# Patient Record
Sex: MALE | Race: WHITE | NOT HISPANIC OR LATINO | URBAN - METROPOLITAN AREA
[De-identification: names, ages, dates, MRNs, and addresses within clinical notes are randomized per-mention and may not be internally consistent; named-entity substitution may affect disease eponyms.]

---

## 2017-06-20 ENCOUNTER — FOLLOW UP (OUTPATIENT)
Dept: URBAN - METROPOLITAN AREA CLINIC 52 | Facility: CLINIC | Age: 57
End: 2017-06-20

## 2017-06-20 DIAGNOSIS — H35.351: ICD-10-CM

## 2017-06-20 DIAGNOSIS — Z98.890: ICD-10-CM

## 2017-06-20 DIAGNOSIS — H33.001: ICD-10-CM

## 2017-06-20 DIAGNOSIS — H43.813: ICD-10-CM

## 2017-06-20 PROCEDURE — 92226 OPHTHALMOSCOPY (SUB): CPT

## 2017-06-20 PROCEDURE — G8427 DOCREV CUR MEDS BY ELIG CLIN: HCPCS

## 2017-06-20 PROCEDURE — 92014 COMPRE OPH EXAM EST PT 1/>: CPT

## 2017-06-20 PROCEDURE — 92134 CPTRZ OPH DX IMG PST SGM RTA: CPT

## 2017-06-20 PROCEDURE — 4040F PNEUMOC VAC/ADMIN/RCVD: CPT

## 2017-06-20 PROCEDURE — 1036F TOBACCO NON-USER: CPT

## 2017-06-20 ASSESSMENT — VISUAL ACUITY
OD_CC: 20/40
OS_CC: 20/30-1
OD_CC: 20/60-1
OD_PH: 20/50-1
OS_CC: 20/30-1

## 2017-06-20 ASSESSMENT — TONOMETRY
OD_IOP_MMHG: 14
OS_IOP_MMHG: 16

## 2018-06-15 ENCOUNTER — FOLLOW UP (OUTPATIENT)
Dept: URBAN - METROPOLITAN AREA CLINIC 52 | Facility: CLINIC | Age: 58
End: 2018-06-15

## 2018-06-15 DIAGNOSIS — H35.351: ICD-10-CM

## 2018-06-15 DIAGNOSIS — H33.001: ICD-10-CM

## 2018-06-15 DIAGNOSIS — H43.813: ICD-10-CM

## 2018-06-15 DIAGNOSIS — Z98.890: ICD-10-CM

## 2018-06-15 PROCEDURE — 92014 COMPRE OPH EXAM EST PT 1/>: CPT

## 2018-06-15 PROCEDURE — 92226 OPHTHALMOSCOPY (SUB): CPT

## 2018-06-15 PROCEDURE — 92134 CPTRZ OPH DX IMG PST SGM RTA: CPT

## 2018-06-15 PROCEDURE — G8427 DOCREV CUR MEDS BY ELIG CLIN: HCPCS

## 2018-06-15 PROCEDURE — 4040F PNEUMOC VAC/ADMIN/RCVD: CPT

## 2018-06-15 PROCEDURE — 1036F TOBACCO NON-USER: CPT

## 2018-06-15 ASSESSMENT — VISUAL ACUITY
OS_CC: 20/30-2
OD_CC: 20/30
OD_CC: 20/40
OS_CC: 20/40

## 2018-06-15 ASSESSMENT — TONOMETRY
OS_IOP_MMHG: 15
OD_IOP_MMHG: 15

## 2019-07-18 ENCOUNTER — FOLLOW UP (OUTPATIENT)
Dept: URBAN - METROPOLITAN AREA CLINIC 52 | Facility: CLINIC | Age: 59
End: 2019-07-18

## 2019-07-18 DIAGNOSIS — H35.351: ICD-10-CM

## 2019-07-18 DIAGNOSIS — H43.813: ICD-10-CM

## 2019-07-18 DIAGNOSIS — E11.9: ICD-10-CM

## 2019-07-18 DIAGNOSIS — H33.001: ICD-10-CM

## 2019-07-18 DIAGNOSIS — H35.371: ICD-10-CM

## 2019-07-18 DIAGNOSIS — H25.9: ICD-10-CM

## 2019-07-18 DIAGNOSIS — Z79.4: ICD-10-CM

## 2019-07-18 PROCEDURE — 92226 OPHTHALMOSCOPY (SUB): CPT

## 2019-07-18 PROCEDURE — 92014 COMPRE OPH EXAM EST PT 1/>: CPT

## 2019-07-18 PROCEDURE — 92134 CPTRZ OPH DX IMG PST SGM RTA: CPT

## 2019-07-18 ASSESSMENT — VISUAL ACUITY
OD_CC: 20/30
OD_CC: 20/30
OS_CC: 20/40
OS_CC: 20/30

## 2019-07-18 ASSESSMENT — TONOMETRY
OD_IOP_MMHG: 9
OS_IOP_MMHG: 10

## 2020-09-14 ENCOUNTER — FOLLOW UP (OUTPATIENT)
Dept: URBAN - METROPOLITAN AREA CLINIC 52 | Facility: CLINIC | Age: 60
End: 2020-09-14

## 2020-09-14 VITALS — HEIGHT: 60 IN

## 2020-09-14 DIAGNOSIS — H25.9: ICD-10-CM

## 2020-09-14 DIAGNOSIS — H35.371: ICD-10-CM

## 2020-09-14 DIAGNOSIS — H43.813: ICD-10-CM

## 2020-09-14 DIAGNOSIS — E11.9: ICD-10-CM

## 2020-09-14 DIAGNOSIS — H33.001: ICD-10-CM

## 2020-09-14 DIAGNOSIS — Z79.4: ICD-10-CM

## 2020-09-14 DIAGNOSIS — H35.351: ICD-10-CM

## 2020-09-14 PROCEDURE — 92014 COMPRE OPH EXAM EST PT 1/>: CPT

## 2020-09-14 PROCEDURE — 92201 OPSCPY EXTND RTA DRAW UNI/BI: CPT

## 2020-09-14 PROCEDURE — 92134 CPTRZ OPH DX IMG PST SGM RTA: CPT

## 2020-09-14 ASSESSMENT — TONOMETRY
OS_IOP_MMHG: 16
OD_IOP_MMHG: 17

## 2020-09-14 ASSESSMENT — VISUAL ACUITY
OS_CC: 20/30
OD_CC: 20/40-1

## 2021-09-21 ENCOUNTER — FOLLOW UP (OUTPATIENT)
Dept: URBAN - METROPOLITAN AREA CLINIC 52 | Facility: CLINIC | Age: 61
End: 2021-09-21

## 2021-09-21 DIAGNOSIS — H35.351: ICD-10-CM

## 2021-09-21 DIAGNOSIS — H25.9: ICD-10-CM

## 2021-09-21 DIAGNOSIS — H33.001: ICD-10-CM

## 2021-09-21 DIAGNOSIS — H35.371: ICD-10-CM

## 2021-09-21 DIAGNOSIS — Z79.4: ICD-10-CM

## 2021-09-21 DIAGNOSIS — H43.813: ICD-10-CM

## 2021-09-21 DIAGNOSIS — E11.9: ICD-10-CM

## 2021-09-21 PROCEDURE — 92014 COMPRE OPH EXAM EST PT 1/>: CPT

## 2021-09-21 PROCEDURE — 92134 CPTRZ OPH DX IMG PST SGM RTA: CPT

## 2021-09-21 PROCEDURE — 92201 OPSCPY EXTND RTA DRAW UNI/BI: CPT

## 2021-09-21 ASSESSMENT — VISUAL ACUITY
OD_CC: 20/40-2
OS_CC: 20/30-2

## 2021-09-21 ASSESSMENT — TONOMETRY
OD_IOP_MMHG: 14
OS_IOP_MMHG: 15

## 2024-09-12 ENCOUNTER — ESTABLISHED COMPREHENSIVE EXAM (OUTPATIENT)
Dept: URBAN - METROPOLITAN AREA CLINIC 6 | Facility: CLINIC | Age: 64
End: 2024-09-12

## 2024-09-12 DIAGNOSIS — H43.813: ICD-10-CM

## 2024-09-12 DIAGNOSIS — H04.123: ICD-10-CM

## 2024-09-12 DIAGNOSIS — H02.831: ICD-10-CM

## 2024-09-12 DIAGNOSIS — H01.024: ICD-10-CM

## 2024-09-12 DIAGNOSIS — H02.834: ICD-10-CM

## 2024-09-12 DIAGNOSIS — E11.9: ICD-10-CM

## 2024-09-12 DIAGNOSIS — H25.813: ICD-10-CM

## 2024-09-12 DIAGNOSIS — H01.021: ICD-10-CM

## 2024-09-12 DIAGNOSIS — B88.0: ICD-10-CM

## 2024-09-12 DIAGNOSIS — H35.371: ICD-10-CM

## 2024-09-12 DIAGNOSIS — Z79.4: ICD-10-CM

## 2024-09-12 PROCEDURE — 92014 COMPRE OPH EXAM EST PT 1/>: CPT

## 2024-09-12 ASSESSMENT — TONOMETRY
OD_IOP_MMHG: 16
OS_IOP_MMHG: 17

## 2024-09-12 ASSESSMENT — VISUAL ACUITY
OD_GLARE: 20/50
OD_CC: 20/30
OS_CC: 20/25-2

## 2024-10-30 ENCOUNTER — PRE-OP CATARACT MEASUREMENTS (OUTPATIENT)
Dept: URBAN - METROPOLITAN AREA CLINIC 6 | Facility: CLINIC | Age: 64
End: 2024-10-30

## 2024-10-30 DIAGNOSIS — B88.0: ICD-10-CM

## 2024-10-30 DIAGNOSIS — H25.813: ICD-10-CM

## 2024-10-30 PROCEDURE — 92012 INTRM OPH EXAM EST PATIENT: CPT

## 2024-10-30 PROCEDURE — 92136 OPHTHALMIC BIOMETRY: CPT

## 2024-10-30 ASSESSMENT — VISUAL ACUITY
OU_CC: J2
OS_GLARE: 20/200
OD_GLARE: 20/200
OS_CC: 20/60+2
OD_PH: 20/50
OD_CC: 20/60

## 2024-10-30 ASSESSMENT — TONOMETRY
OD_IOP_MMHG: 11
OS_IOP_MMHG: 16

## 2024-11-20 ENCOUNTER — 1 DAY POST-OP (OUTPATIENT)
Dept: URBAN - METROPOLITAN AREA CLINIC 6 | Facility: CLINIC | Age: 64
End: 2024-11-20

## 2024-11-20 DIAGNOSIS — Z96.1: ICD-10-CM

## 2024-11-20 DIAGNOSIS — H25.811: ICD-10-CM

## 2024-11-20 PROCEDURE — 92136 OPHTHALMIC BIOMETRY: CPT

## 2024-11-20 PROCEDURE — 99024 POSTOP FOLLOW-UP VISIT: CPT

## 2024-11-20 ASSESSMENT — VISUAL ACUITY
OS_SC: 20/30-1
OD_SC: J2
OS_SC: J3
OD_SC: 20/100-1

## 2024-11-20 ASSESSMENT — TONOMETRY
OS_IOP_MMHG: 21
OD_IOP_MMHG: 18

## 2024-11-27 ENCOUNTER — 1 WEEK POST-OP (OUTPATIENT)
Dept: URBAN - METROPOLITAN AREA CLINIC 6 | Facility: CLINIC | Age: 64
End: 2024-11-27

## 2024-11-27 DIAGNOSIS — H25.811: ICD-10-CM

## 2024-11-27 DIAGNOSIS — Z96.1: ICD-10-CM

## 2024-11-27 PROCEDURE — 99024 POSTOP FOLLOW-UP VISIT: CPT

## 2024-11-27 ASSESSMENT — VISUAL ACUITY
OD_SC: 20/50
OU_CC: J16
OS_SC: 20/40
OU_SC: J3

## 2024-11-27 ASSESSMENT — TONOMETRY
OS_IOP_MMHG: 16
OD_IOP_MMHG: 13

## 2024-12-04 ENCOUNTER — 1 DAY POST-OP (OUTPATIENT)
Dept: URBAN - METROPOLITAN AREA CLINIC 6 | Facility: CLINIC | Age: 64
End: 2024-12-04

## 2024-12-04 DIAGNOSIS — Z96.1: ICD-10-CM

## 2024-12-04 PROCEDURE — 99024 POSTOP FOLLOW-UP VISIT: CPT

## 2024-12-04 ASSESSMENT — VISUAL ACUITY
OS_SC: 20/25+2
OD_SC: 20/30

## 2024-12-04 ASSESSMENT — TONOMETRY
OS_IOP_MMHG: 17
OD_IOP_MMHG: 14

## 2024-12-11 ENCOUNTER — 1 WEEK POST-OP (OUTPATIENT)
Dept: URBAN - METROPOLITAN AREA CLINIC 6 | Facility: CLINIC | Age: 64
End: 2024-12-11

## 2024-12-11 DIAGNOSIS — Z96.1: ICD-10-CM

## 2024-12-11 PROCEDURE — 99024 POSTOP FOLLOW-UP VISIT: CPT

## 2024-12-11 ASSESSMENT — VISUAL ACUITY
OU_SC: 20/30-1
OS_SC: 20/30-2
OD_SC: 20/30

## 2024-12-11 ASSESSMENT — TONOMETRY
OS_IOP_MMHG: 16
OD_IOP_MMHG: 17

## 2024-12-17 LAB
ALBUMIN SERPL BCG-MCNC: 3.7 G/DL (ref 3.5–5)
ALP SERPL-CCNC: 71 U/L (ref 34–104)
ALT SERPL W P-5'-P-CCNC: 17 U/L (ref 7–52)
ANION GAP SERPL CALCULATED.3IONS-SCNC: 9 MMOL/L (ref 4–13)
AST SERPL W P-5'-P-CCNC: 20 U/L (ref 13–39)
BASOPHILS # BLD AUTO: 0.05 THOUSANDS/ΜL (ref 0–0.1)
BASOPHILS NFR BLD AUTO: 0 % (ref 0–1)
BILIRUB SERPL-MCNC: 0.69 MG/DL (ref 0.2–1)
BUN SERPL-MCNC: 60 MG/DL (ref 5–25)
CALCIUM SERPL-MCNC: 7.9 MG/DL (ref 8.4–10.2)
CHLORIDE SERPL-SCNC: 97 MMOL/L (ref 96–108)
CO2 SERPL-SCNC: 21 MMOL/L (ref 21–32)
CREAT SERPL-MCNC: 2.56 MG/DL (ref 0.6–1.3)
EOSINOPHIL # BLD AUTO: 0.02 THOUSAND/ΜL (ref 0–0.61)
EOSINOPHIL NFR BLD AUTO: 0 % (ref 0–6)
ERYTHROCYTE [DISTWIDTH] IN BLOOD BY AUTOMATED COUNT: 15.6 % (ref 11.6–15.1)
FLUAV RNA RESP QL NAA+PROBE: NEGATIVE
FLUBV RNA RESP QL NAA+PROBE: NEGATIVE
GFR SERPL CREATININE-BSD FRML MDRD: 25 ML/MIN/1.73SQ M
GLUCOSE SERPL-MCNC: 285 MG/DL (ref 65–140)
HCT VFR BLD AUTO: 57.6 % (ref 36.5–49.3)
HGB BLD-MCNC: 18 G/DL (ref 12–17)
IMM GRANULOCYTES # BLD AUTO: 0.05 THOUSAND/UL (ref 0–0.2)
IMM GRANULOCYTES NFR BLD AUTO: 0 % (ref 0–2)
LIPASE SERPL-CCNC: 49 U/L (ref 11–82)
LYMPHOCYTES # BLD AUTO: 0.78 THOUSANDS/ΜL (ref 0.6–4.47)
LYMPHOCYTES NFR BLD AUTO: 6 % (ref 14–44)
MCH RBC QN AUTO: 27.2 PG (ref 26.8–34.3)
MCHC RBC AUTO-ENTMCNC: 31.7 G/DL (ref 31.4–37.4)
MCV RBC AUTO: 86 FL (ref 82–98)
MONOCYTES # BLD AUTO: 0.97 THOUSAND/ΜL (ref 0.17–1.22)
MONOCYTES NFR BLD AUTO: 7 % (ref 4–12)
NEUTROPHILS # BLD AUTO: 11.51 THOUSANDS/ΜL (ref 1.85–7.62)
NEUTS SEG NFR BLD AUTO: 87 % (ref 43–75)
NRBC BLD AUTO-RTO: 0 /100 WBCS
PLATELET # BLD AUTO: 203 THOUSANDS/UL (ref 149–390)
PMV BLD AUTO: 10 FL (ref 8.9–12.7)
POTASSIUM SERPL-SCNC: 5 MMOL/L (ref 3.5–5.3)
PROT SERPL-MCNC: 6 G/DL (ref 6.4–8.4)
RBC # BLD AUTO: 6.62 MILLION/UL (ref 3.88–5.62)
RSV RNA RESP QL NAA+PROBE: NEGATIVE
SARS-COV-2 RNA RESP QL NAA+PROBE: NEGATIVE
SODIUM SERPL-SCNC: 127 MMOL/L (ref 135–147)
WBC # BLD AUTO: 13.38 THOUSAND/UL (ref 4.31–10.16)

## 2024-12-17 PROCEDURE — 99285 EMERGENCY DEPT VISIT HI MDM: CPT

## 2024-12-17 PROCEDURE — 82550 ASSAY OF CK (CPK): CPT

## 2024-12-17 PROCEDURE — 83690 ASSAY OF LIPASE: CPT | Performed by: EMERGENCY MEDICINE

## 2024-12-17 PROCEDURE — 36415 COLL VENOUS BLD VENIPUNCTURE: CPT

## 2024-12-17 PROCEDURE — 85025 COMPLETE CBC W/AUTO DIFF WBC: CPT | Performed by: EMERGENCY MEDICINE

## 2024-12-17 PROCEDURE — 80053 COMPREHEN METABOLIC PANEL: CPT | Performed by: EMERGENCY MEDICINE

## 2024-12-17 PROCEDURE — 83735 ASSAY OF MAGNESIUM: CPT

## 2024-12-17 PROCEDURE — 0241U HB NFCT DS VIR RESP RNA 4 TRGT: CPT | Performed by: EMERGENCY MEDICINE

## 2024-12-18 ENCOUNTER — APPOINTMENT (EMERGENCY)
Dept: CT IMAGING | Facility: HOSPITAL | Age: 64
DRG: 689 | End: 2024-12-18
Payer: MEDICARE

## 2024-12-18 ENCOUNTER — APPOINTMENT (EMERGENCY)
Dept: RADIOLOGY | Facility: HOSPITAL | Age: 64
DRG: 689 | End: 2024-12-18
Payer: MEDICARE

## 2024-12-18 ENCOUNTER — HOSPITAL ENCOUNTER (INPATIENT)
Facility: HOSPITAL | Age: 64
LOS: 2 days | Discharge: HOME | DRG: 689 | End: 2024-12-20
Attending: EMERGENCY MEDICINE | Admitting: HOSPITALIST
Payer: MEDICARE

## 2024-12-18 DIAGNOSIS — N39.0 UTI (URINARY TRACT INFECTION): ICD-10-CM

## 2024-12-18 DIAGNOSIS — E87.1 HYPONATREMIA: ICD-10-CM

## 2024-12-18 DIAGNOSIS — J18.9 PNEUMONIA: ICD-10-CM

## 2024-12-18 DIAGNOSIS — A41.9 SEPSIS (HCC): Primary | ICD-10-CM

## 2024-12-18 DIAGNOSIS — R73.9 HYPERGLYCEMIA: ICD-10-CM

## 2024-12-18 PROBLEM — N18.32 STAGE 3B CHRONIC KIDNEY DISEASE (HCC): Status: ACTIVE | Noted: 2024-12-18

## 2024-12-18 PROBLEM — E78.5 HLD (HYPERLIPIDEMIA): Status: ACTIVE | Noted: 2024-12-18

## 2024-12-18 PROBLEM — D75.1 ERYTHROCYTOSIS: Status: ACTIVE | Noted: 2024-12-18

## 2024-12-18 PROBLEM — E11.9 TYPE 2 DIABETES MELLITUS (HCC): Status: ACTIVE | Noted: 2024-12-18

## 2024-12-18 PROBLEM — R06.02 SHORTNESS OF BREATH: Status: ACTIVE | Noted: 2024-12-18

## 2024-12-18 PROBLEM — R31.9 HEMATURIA: Status: ACTIVE | Noted: 2024-12-18

## 2024-12-18 PROBLEM — I48.91 A-FIB (HCC): Status: ACTIVE | Noted: 2024-12-18

## 2024-12-18 LAB
ANION GAP SERPL CALCULATED.3IONS-SCNC: 8 MMOL/L (ref 4–13)
ANION GAP SERPL CALCULATED.3IONS-SCNC: 9 MMOL/L (ref 4–13)
APTT PPP: 33 SECONDS (ref 23–34)
ATRIAL RATE: 102 BPM
BACTERIA UR QL AUTO: ABNORMAL /HPF
BASOPHILS # BLD AUTO: 0.04 THOUSANDS/ΜL (ref 0–0.1)
BASOPHILS NFR BLD AUTO: 0 % (ref 0–1)
BILIRUB UR QL STRIP: NEGATIVE
BUN SERPL-MCNC: 61 MG/DL (ref 5–25)
BUN SERPL-MCNC: 63 MG/DL (ref 5–25)
C3 SERPL-MCNC: 110 MG/DL (ref 87–200)
CALCIUM SERPL-MCNC: 7.7 MG/DL (ref 8.4–10.2)
CALCIUM SERPL-MCNC: 7.8 MG/DL (ref 8.4–10.2)
CHLORIDE SERPL-SCNC: 98 MMOL/L (ref 96–108)
CHLORIDE SERPL-SCNC: 98 MMOL/L (ref 96–108)
CK SERPL-CCNC: 72 U/L (ref 39–308)
CLARITY UR: CLEAR
CO2 SERPL-SCNC: 20 MMOL/L (ref 21–32)
CO2 SERPL-SCNC: 21 MMOL/L (ref 21–32)
COLOR UR: ABNORMAL
CREAT SERPL-MCNC: 2.75 MG/DL (ref 0.6–1.3)
CREAT SERPL-MCNC: 2.87 MG/DL (ref 0.6–1.3)
EOSINOPHIL # BLD AUTO: 0.02 THOUSAND/ΜL (ref 0–0.61)
EOSINOPHIL NFR BLD AUTO: 0 % (ref 0–6)
ERYTHROCYTE [DISTWIDTH] IN BLOOD BY AUTOMATED COUNT: 15.5 % (ref 11.6–15.1)
EST. AVERAGE GLUCOSE BLD GHB EST-MCNC: 163 MG/DL
GFR SERPL CREATININE-BSD FRML MDRD: 21 ML/MIN/1.73SQ M
GFR SERPL CREATININE-BSD FRML MDRD: 22 ML/MIN/1.73SQ M
GLUCOSE SERPL-MCNC: 155 MG/DL (ref 65–140)
GLUCOSE SERPL-MCNC: 172 MG/DL (ref 65–140)
GLUCOSE SERPL-MCNC: 186 MG/DL (ref 65–140)
GLUCOSE SERPL-MCNC: 193 MG/DL (ref 65–140)
GLUCOSE SERPL-MCNC: 193 MG/DL (ref 65–140)
GLUCOSE SERPL-MCNC: 227 MG/DL (ref 65–140)
GLUCOSE UR STRIP-MCNC: ABNORMAL MG/DL
HBA1C MFR BLD: 7.3 %
HCT VFR BLD AUTO: 53.8 % (ref 36.5–49.3)
HGB BLD-MCNC: 17.5 G/DL (ref 12–17)
HGB UR QL STRIP.AUTO: ABNORMAL
IMM GRANULOCYTES # BLD AUTO: 0.04 THOUSAND/UL (ref 0–0.2)
IMM GRANULOCYTES NFR BLD AUTO: 0 % (ref 0–2)
INR PPP: 1.82 (ref 0.85–1.19)
KETONES UR STRIP-MCNC: NEGATIVE MG/DL
L PNEUMO1 AG UR QL IA.RAPID: NEGATIVE
LACTATE SERPL-SCNC: 1.2 MMOL/L (ref 0.5–2)
LEUKOCYTE ESTERASE UR QL STRIP: ABNORMAL
LYMPHOCYTES # BLD AUTO: 0.84 THOUSANDS/ΜL (ref 0.6–4.47)
LYMPHOCYTES NFR BLD AUTO: 7 % (ref 14–44)
MAGNESIUM SERPL-MCNC: 2.4 MG/DL (ref 1.9–2.7)
MCH RBC QN AUTO: 27.3 PG (ref 26.8–34.3)
MCHC RBC AUTO-ENTMCNC: 32.5 G/DL (ref 31.4–37.4)
MCV RBC AUTO: 84 FL (ref 82–98)
MONOCYTES # BLD AUTO: 1.12 THOUSAND/ΜL (ref 0.17–1.22)
MONOCYTES NFR BLD AUTO: 9 % (ref 4–12)
NEUTROPHILS # BLD AUTO: 10.6 THOUSANDS/ΜL (ref 1.85–7.62)
NEUTS SEG NFR BLD AUTO: 84 % (ref 43–75)
NITRITE UR QL STRIP: NEGATIVE
NON-SQ EPI CELLS URNS QL MICRO: ABNORMAL /HPF
NRBC BLD AUTO-RTO: 0 /100 WBCS
OSMOLALITY UR/SERPL-RTO: 299 MMOL/KG (ref 282–298)
OSMOLALITY UR: 580 MMOL/KG (ref 250–900)
PH UR STRIP.AUTO: 5 [PH]
PLATELET # BLD AUTO: 229 THOUSANDS/UL (ref 149–390)
PMV BLD AUTO: 10.6 FL (ref 8.9–12.7)
POTASSIUM SERPL-SCNC: 4.7 MMOL/L (ref 3.5–5.3)
POTASSIUM SERPL-SCNC: 5.9 MMOL/L (ref 3.5–5.3)
PROCALCITONIN SERPL-MCNC: 0.23 NG/ML
PROT UR STRIP-MCNC: NEGATIVE MG/DL
PROTHROMBIN TIME: 21.8 SECONDS (ref 12.3–15)
QRS AXIS: 255 DEGREES
QRSD INTERVAL: 142 MS
QT INTERVAL: 388 MS
QTC INTERVAL: 508 MS
RBC # BLD AUTO: 6.42 MILLION/UL (ref 3.88–5.62)
RBC #/AREA URNS AUTO: ABNORMAL /HPF
S PNEUM AG UR QL: NEGATIVE
SODIUM 24H UR-SCNC: 33 MOL/L
SODIUM SERPL-SCNC: 126 MMOL/L (ref 135–147)
SODIUM SERPL-SCNC: 128 MMOL/L (ref 135–147)
SP GR UR STRIP.AUTO: 1.02 (ref 1–1.03)
T WAVE AXIS: 27 DEGREES
UROBILINOGEN UR STRIP-ACNC: <2 MG/DL
VENTRICULAR RATE: 103 BPM
WBC # BLD AUTO: 12.66 THOUSAND/UL (ref 4.31–10.16)
WBC #/AREA URNS AUTO: ABNORMAL /HPF

## 2024-12-18 PROCEDURE — 81001 URINALYSIS AUTO W/SCOPE: CPT | Performed by: EMERGENCY MEDICINE

## 2024-12-18 PROCEDURE — 94760 N-INVAS EAR/PLS OXIMETRY 1: CPT

## 2024-12-18 PROCEDURE — 83935 ASSAY OF URINE OSMOLALITY: CPT

## 2024-12-18 PROCEDURE — 94640 AIRWAY INHALATION TREATMENT: CPT

## 2024-12-18 PROCEDURE — 96375 TX/PRO/DX INJ NEW DRUG ADDON: CPT

## 2024-12-18 PROCEDURE — 87086 URINE CULTURE/COLONY COUNT: CPT

## 2024-12-18 PROCEDURE — 99223 1ST HOSP IP/OBS HIGH 75: CPT | Performed by: HOSPITALIST

## 2024-12-18 PROCEDURE — 84145 PROCALCITONIN (PCT): CPT

## 2024-12-18 PROCEDURE — 36415 COLL VENOUS BLD VENIPUNCTURE: CPT

## 2024-12-18 PROCEDURE — 85730 THROMBOPLASTIN TIME PARTIAL: CPT

## 2024-12-18 PROCEDURE — 96361 HYDRATE IV INFUSION ADD-ON: CPT

## 2024-12-18 PROCEDURE — 74176 CT ABD & PELVIS W/O CONTRAST: CPT

## 2024-12-18 PROCEDURE — 85610 PROTHROMBIN TIME: CPT

## 2024-12-18 PROCEDURE — 87449 NOS EACH ORGANISM AG IA: CPT

## 2024-12-18 PROCEDURE — 71250 CT THORAX DX C-: CPT

## 2024-12-18 PROCEDURE — 86160 COMPLEMENT ANTIGEN: CPT

## 2024-12-18 PROCEDURE — 80048 BASIC METABOLIC PNL TOTAL CA: CPT

## 2024-12-18 PROCEDURE — 83605 ASSAY OF LACTIC ACID: CPT

## 2024-12-18 PROCEDURE — 94668 MNPJ CHEST WALL SBSQ: CPT

## 2024-12-18 PROCEDURE — 82948 REAGENT STRIP/BLOOD GLUCOSE: CPT

## 2024-12-18 PROCEDURE — 71045 X-RAY EXAM CHEST 1 VIEW: CPT

## 2024-12-18 PROCEDURE — 93010 ELECTROCARDIOGRAM REPORT: CPT | Performed by: INTERNAL MEDICINE

## 2024-12-18 PROCEDURE — 99285 EMERGENCY DEPT VISIT HI MDM: CPT | Performed by: EMERGENCY MEDICINE

## 2024-12-18 PROCEDURE — 85025 COMPLETE CBC W/AUTO DIFF WBC: CPT

## 2024-12-18 PROCEDURE — 83930 ASSAY OF BLOOD OSMOLALITY: CPT

## 2024-12-18 PROCEDURE — 84300 ASSAY OF URINE SODIUM: CPT

## 2024-12-18 PROCEDURE — 83036 HEMOGLOBIN GLYCOSYLATED A1C: CPT

## 2024-12-18 PROCEDURE — 93005 ELECTROCARDIOGRAM TRACING: CPT

## 2024-12-18 PROCEDURE — 96374 THER/PROPH/DIAG INJ IV PUSH: CPT

## 2024-12-18 RX ORDER — LOSARTAN POTASSIUM 25 MG/1
25 TABLET ORAL
Status: DISCONTINUED | OUTPATIENT
Start: 2024-12-18 | End: 2024-12-20 | Stop reason: HOSPADM

## 2024-12-18 RX ORDER — INSULIN LISPRO 100 [IU]/ML
2-12 INJECTION, SOLUTION INTRAVENOUS; SUBCUTANEOUS
Status: DISCONTINUED | OUTPATIENT
Start: 2024-12-18 | End: 2024-12-20 | Stop reason: HOSPADM

## 2024-12-18 RX ORDER — LEVALBUTEROL INHALATION SOLUTION 0.63 MG/3ML
0.63 SOLUTION RESPIRATORY (INHALATION) 2 TIMES DAILY
Status: DISCONTINUED | OUTPATIENT
Start: 2024-12-18 | End: 2024-12-18

## 2024-12-18 RX ORDER — GUAIFENESIN 600 MG/1
600 TABLET, EXTENDED RELEASE ORAL EVERY 12 HOURS SCHEDULED
Status: DISCONTINUED | OUTPATIENT
Start: 2024-12-18 | End: 2024-12-20 | Stop reason: HOSPADM

## 2024-12-18 RX ORDER — SODIUM CHLORIDE 9 MG/ML
75 INJECTION, SOLUTION INTRAVENOUS CONTINUOUS
Status: DISPENSED | OUTPATIENT
Start: 2024-12-18 | End: 2024-12-18

## 2024-12-18 RX ORDER — ACETAMINOPHEN 325 MG/1
650 TABLET ORAL EVERY 6 HOURS PRN
Status: DISCONTINUED | OUTPATIENT
Start: 2024-12-18 | End: 2024-12-18

## 2024-12-18 RX ORDER — PRAVASTATIN SODIUM 40 MG
40 TABLET ORAL
Status: DISCONTINUED | OUTPATIENT
Start: 2024-12-18 | End: 2024-12-20 | Stop reason: HOSPADM

## 2024-12-18 RX ORDER — ONDANSETRON 2 MG/ML
4 INJECTION INTRAMUSCULAR; INTRAVENOUS EVERY 8 HOURS PRN
Status: DISCONTINUED | OUTPATIENT
Start: 2024-12-18 | End: 2024-12-18

## 2024-12-18 RX ORDER — ONDANSETRON 2 MG/ML
4 INJECTION INTRAMUSCULAR; INTRAVENOUS ONCE
Status: COMPLETED | OUTPATIENT
Start: 2024-12-18 | End: 2024-12-18

## 2024-12-18 RX ORDER — METOPROLOL TARTRATE 50 MG
50 TABLET ORAL EVERY 12 HOURS SCHEDULED
Status: DISCONTINUED | OUTPATIENT
Start: 2024-12-18 | End: 2024-12-20 | Stop reason: HOSPADM

## 2024-12-18 RX ORDER — INSULIN GLARGINE 100 [IU]/ML
40 INJECTION, SOLUTION SUBCUTANEOUS EVERY 12 HOURS SCHEDULED
Status: DISCONTINUED | OUTPATIENT
Start: 2024-12-18 | End: 2024-12-20 | Stop reason: HOSPADM

## 2024-12-18 RX ORDER — ACETAMINOPHEN 325 MG/1
975 TABLET ORAL EVERY 6 HOURS PRN
Status: DISCONTINUED | OUTPATIENT
Start: 2024-12-18 | End: 2024-12-20 | Stop reason: HOSPADM

## 2024-12-18 RX ORDER — AZITHROMYCIN 250 MG/1
500 TABLET, FILM COATED ORAL EVERY 24 HOURS
Status: DISCONTINUED | OUTPATIENT
Start: 2024-12-18 | End: 2024-12-19

## 2024-12-18 RX ORDER — HYDROMORPHONE HCL/PF 1 MG/ML
0.5 SYRINGE (ML) INJECTION ONCE
Refills: 0 | Status: COMPLETED | OUTPATIENT
Start: 2024-12-18 | End: 2024-12-18

## 2024-12-18 RX ORDER — LEVALBUTEROL INHALATION SOLUTION 1.25 MG/3ML
1.25 SOLUTION RESPIRATORY (INHALATION)
Status: DISCONTINUED | OUTPATIENT
Start: 2024-12-18 | End: 2024-12-20 | Stop reason: HOSPADM

## 2024-12-18 RX ADMIN — ONDANSETRON 4 MG: 2 INJECTION, SOLUTION INTRAMUSCULAR; INTRAVENOUS at 02:43

## 2024-12-18 RX ADMIN — INSULIN LISPRO 2 UNITS: 100 INJECTION, SOLUTION INTRAVENOUS; SUBCUTANEOUS at 21:06

## 2024-12-18 RX ADMIN — INSULIN LISPRO 2 UNITS: 100 INJECTION, SOLUTION INTRAVENOUS; SUBCUTANEOUS at 13:20

## 2024-12-18 RX ADMIN — METOPROLOL TARTRATE 50 MG: 50 TABLET, FILM COATED ORAL at 09:22

## 2024-12-18 RX ADMIN — METOPROLOL TARTRATE 50 MG: 50 TABLET, FILM COATED ORAL at 21:05

## 2024-12-18 RX ADMIN — INSULIN LISPRO 2 UNITS: 100 INJECTION, SOLUTION INTRAVENOUS; SUBCUTANEOUS at 18:29

## 2024-12-18 RX ADMIN — AZITHROMYCIN DIHYDRATE 500 MG: 250 TABLET ORAL at 05:59

## 2024-12-18 RX ADMIN — SODIUM CHLORIDE 500 ML: 0.9 INJECTION, SOLUTION INTRAVENOUS at 02:46

## 2024-12-18 RX ADMIN — HYDROMORPHONE HYDROCHLORIDE 0.5 MG: 1 INJECTION, SOLUTION INTRAMUSCULAR; INTRAVENOUS; SUBCUTANEOUS at 02:46

## 2024-12-18 RX ADMIN — INSULIN GLARGINE 40 UNITS: 100 INJECTION, SOLUTION SUBCUTANEOUS at 21:05

## 2024-12-18 RX ADMIN — DEXTROSE 1000 MG: 50 INJECTION, SOLUTION INTRAVENOUS at 04:58

## 2024-12-18 RX ADMIN — GUAIFENESIN 600 MG: 600 TABLET ORAL at 21:05

## 2024-12-18 RX ADMIN — PRAVASTATIN SODIUM 40 MG: 40 TABLET ORAL at 16:19

## 2024-12-18 RX ADMIN — INSULIN LISPRO 2 UNITS: 100 INJECTION, SOLUTION INTRAVENOUS; SUBCUTANEOUS at 09:19

## 2024-12-18 RX ADMIN — LEVALBUTEROL HYDROCHLORIDE 1.25 MG: 1.25 SOLUTION RESPIRATORY (INHALATION) at 19:34

## 2024-12-18 RX ADMIN — INSULIN GLARGINE 40 UNITS: 100 INJECTION, SOLUTION SUBCUTANEOUS at 09:21

## 2024-12-18 RX ADMIN — SODIUM CHLORIDE 75 ML/HR: 0.9 INJECTION, SOLUTION INTRAVENOUS at 06:03

## 2024-12-18 RX ADMIN — GUAIFENESIN 600 MG: 600 TABLET ORAL at 09:22

## 2024-12-18 RX ADMIN — LEVALBUTEROL HYDROCHLORIDE 0.63 MG: 0.63 SOLUTION RESPIRATORY (INHALATION) at 06:06

## 2024-12-18 RX ADMIN — TRIMETHOBENZAMIDE HYDROCHLORIDE 200 MG: 100 INJECTION INTRAMUSCULAR at 10:45

## 2024-12-18 RX ADMIN — CEFTRIAXONE SODIUM 1000 MG: 10 INJECTION, POWDER, FOR SOLUTION INTRAVENOUS at 23:04

## 2024-12-18 RX ADMIN — ACETAMINOPHEN 650 MG: 325 TABLET, FILM COATED ORAL at 13:24

## 2024-12-18 NOTE — ASSESSMENT & PLAN NOTE
Patient's sodium at 127  Can be secondary to decreased oral intake versus pneumonia    PLAN:  Follow-up with urine osmole, urine osmole, urine sodium  Follow-up with Legionella urine  Normal saline 75 mL/h for 12 hours

## 2024-12-18 NOTE — ASSESSMENT & PLAN NOTE
Last A1c September, 2024 was 7.0    Blood Sugar Average: Last 72 hrs:    Patient takes detemir 40 units twice daily, Farxiga    PLAN;  Start Lantus 40 units twice daily  Sliding scale

## 2024-12-18 NOTE — SEPSIS NOTE
"  Sepsis Note   Leandro Blanchard 64 y.o. male MRN: [id number]  Unit/Bed#: ED-33 Encounter: [id number]       Initial Sepsis Screening       Row Name 12/18/24 0425 12/18/24 0424             Is the patient's history suggestive of a new or worsening infection? Yes (Proceed)  -MR Yes (Proceed)  -MR       Suspected source of infection urinary tract infection;pneumonia  -MR --       Indicate SIRS criteria Tachycardia > 90 bpm;Leukocytosis (WBC > 73156 IJL) OR Leukopenia (WBC <4000 IJL) OR Bandemia (WBC >10% bands)  -MR --       Are two or more of the above signs & symptoms of infection both present and new to the patient? Yes (Proceed)  -MR --       Assess for evidence of organ dysfunction: Are any of the below criteria present within 6 hours of suspected infection and SIRS criteria that are NOT considered to be chronic conditions? --  -MR --       Date of presentation of severe sepsis --  -MR --       Time of presentation of severe sepsis --  -MR --       Sepsis Note: Click \"NEXT\" below (NOT \"close\") to generate sepsis note based on above information. -- --                 User Key  (r) = Recorded By, (t) = Taken By, (c) = Cosigned By      Initials Name Provider Type    MR Thanh Lemon MD Resident                        Body mass index is 38.32 kg/m².  Wt Readings from Last 1 Encounters:   12/17/24 118 kg (259 lb 7.7 oz)     IBW (Ideal Body Weight): 70.7 kg    Ideal body weight: 70.7 kg (155 lb 13.8 oz)  Adjusted ideal body weight: 89.5 kg (197 lb 5 oz)    "

## 2024-12-18 NOTE — H&P
H&P - Hospitalist   Name: Leandro Blanchard 64 y.o. male I MRN: [id number]  Unit/Bed#: ED-33 I Date of Admission: 12/18/2024   Date of Service: 12/18/2024 I Hospital Day: 0     Assessment & Plan  Shortness of breath  Presented to the ED with shortness of breath, URI symptoms including congestion cough since first week of December  Patient initially seen by urgent care and was started on doxycycline, no improvement was seen again on December 17 and was started on Augmentin, took 1 dose of  Patient denies any fevers or chills  WBC 13.38, lactic acid normal, flu RSV COVID-negative  CT chest:  Mild tree-in-bud nodularity in the right lower lobe suggestive of an infectious or inflammatory process. Bilateral lower lobe peribronchial thickening.  Patient given 1 dose of ceftriaxone in the ED    PLAN;  Continue ceftriaxone  Start azithromycin  Start Mucinex  Start Xopenex nebs plus albuterol inhaler  Follow-up on Legionella, strep pneumo  Follow-up on procalcitonin  Hematuria  Patient complains of bloody urine for the past few days  Patient also complained of left-sided flank pain  Patient is not a smoker  Urinalysis shows moderate occult blood, urine microscopic shows 10-20 RBCs  CT abdomen: Fullness of the left renal collecting system with perinephric stranding, concerns for acute pyelonephritis  PSA December 2023 within normal limits (1.0)  Patient also has history of kidney stones, no kidney stone seen on the CT scan    PLAN:  Continue ceftriaxone  Hold Xarelto in the setting of active hematuria  Stage 3b chronic kidney disease (HCC)  Lab Results   Component Value Date    EGFR 25 12/17/2024    CREATININE 2.56 (H) 12/17/2024     Elevated creatinine, baseline 2.1-2.3  Patient takes losartan, farxiga, and Kerendia   Patient endorses decrease in appetite for the past 2 days    PLAN:  Hold losartan in the setting of elevated creatinine  Normal saline 75 mL/h for 12 hours  Continue to monitor BMP  Avoid nephrotoxic drugs  Type 2  diabetes mellitus (HCC)  Last A1c September, 2024 was 7.0    Blood Sugar Average: Last 72 hrs:    Patient takes detemir 40 units twice daily, Farxiga    PLAN;  Start Lantus 40 units twice daily  Sliding scale  A-fib (HCC)  Continue metoprolol 50 Mg twice daily  Hold Xarelto in the setting of hematuria  HLD (hyperlipidemia)  Patient takes simvastatin 40 Mg daily at home    PLAN;  Start pravastatin 40 Mg daily while in the hospital  Hyponatremia  Patient's sodium at 127  Can be secondary to decreased oral intake versus pneumonia    PLAN:  Follow-up with urine osmole, urine osmole, urine sodium  Follow-up with Legionella urine  Normal saline 75 mL/h for 12 hours  Erythrocytosis  Lab Results   Component Value Date    HCT 57.6 (H) 12/17/2024    HGB 18.0 (H) 12/17/2024      Patient takes testosterone supplements at home  Erythrocytosis can be secondary to testosterone supplements versus obstructive sleep apnea      VTE Pharmacologic Prophylaxis: VTE Score: 3 Moderate Risk (Score 3-4) - Pharmacological DVT Prophylaxis Contraindicated. Sequential Compression Devices Ordered.  Code Status: Level 1 - Full Code   Discussion with family: Updated  (wife) at bedside.    Anticipated Length of Stay: Patient will be admitted on an observation basis with an anticipated length of stay of less than 2 midnights secondary to management of PNA and hematuria.    History of Present Illness   Chief Complaint: SOB and Hematuria     Leandro Blanchard is a 64 y.o. male with a PMH of CKD, diabetes, A-fib  who presents with left flank pain, bloody urine and shortness of breath.  Patient started having URI symptoms initially on the first week of December for which she went to the urgent care and was started on supportive treatment along with doxycycline.  Patient's symptoms continue to worsen and was seen again in urgent care on December 17, 2024 and was started on Augmentin.  Patient only took 1 dose of Augmentin.  Patient also complains  of hematuria along with left flank pain for the past few days.  Patient has history of kidney stones, denies any smoking history.  In the ED, patient does not show any respiratory distress.  Currently on room air.  Vital signs stable.  CT chest abdomen was done which showed concerns for pneumonia and acute pyelonephritis.  Patient received 1 dose of ceftriaxone.  Patient is currently being admitted under [facility] service for management of pneumonia and acute pyelonephritis.    Review of Systems   Constitutional:  Positive for appetite change and fatigue. Negative for chills and fever.   HENT:  Positive for rhinorrhea and sore throat.    Respiratory:  Positive for cough and shortness of breath.    Cardiovascular: Negative.    Gastrointestinal: Negative.    Genitourinary:  Positive for flank pain. Negative for dysuria and urgency.   Neurological: Negative.    Hematological: Negative.    Psychiatric/Behavioral: Negative.         Historical Information   Past Medical History:   Diagnosis Date    A-fib (HCC)     Diabetes mellitus (HCC)     Kidney calculi      Past Surgical History:   Procedure Laterality Date    CATARACT EXTRACTION       Social History     Tobacco Use    Smoking status: Never    Smokeless tobacco: Never   Vaping Use    Vaping status: Never Used   Substance and Sexual Activity    Alcohol use: Never    Drug use: Never    Sexual activity: Not on file     E-Cigarette/Vaping    E-Cigarette Use Never User      E-Cigarette/Vaping Substances     History reviewed. No pertinent family history.  Social History:  Marital Status: /Civil Union   Occupation: N/A  Patient Pre-hospital Living Situation: Home  Patient Pre-hospital Level of Mobility: walks  Patient Pre-hospital Diet Restrictions: N/A    Meds/Allergies   I have reviewed home medications with patient personally.  Prior to Admission medications    Not on File     Allergies   Allergen Reactions    Varibar Honey [Barium Sulfate] Hives    Latex Rash        Objective :  Temp:  [97.7 °F (36.5 °C)] 97.7 °F (36.5 °C)  HR:  [] 93  BP: (109-130)/(66-70) 109/70  Resp:  [12-16] 16  SpO2:  [94 %-96 %] 94 %  O2 Device: None (Room air)    Physical Exam  HENT:      Head: Normocephalic and atraumatic.      Right Ear: External ear normal.      Left Ear: External ear normal.      Mouth/Throat:      Mouth: Mucous membranes are dry.      Pharynx: Oropharynx is clear.   Eyes:      Extraocular Movements: Extraocular movements intact.      Conjunctiva/sclera: Conjunctivae normal.      Pupils: Pupils are equal, round, and reactive to light.   Cardiovascular:      Rate and Rhythm: Normal rate. Rhythm irregular.      Pulses: Normal pulses.      Heart sounds: Normal heart sounds.   Pulmonary:      Effort: Pulmonary effort is normal.      Breath sounds: Wheezing present.   Abdominal:      General: Bowel sounds are normal.      Palpations: Abdomen is soft.   Musculoskeletal:         General: Normal range of motion.      Right lower leg: No edema.      Left lower leg: No edema.   Skin:     General: Skin is warm.      Capillary Refill: Capillary refill takes less than 2 seconds.   Neurological:      General: No focal deficit present.      Mental Status: He is alert and oriented to person, place, and time.          Lines/Drains:            Lab Results: I have reviewed the following results:  Results from last 7 days   Lab Units 12/17/24  2259   WBC Thousand/uL 13.38*   HEMOGLOBIN g/dL 18.0*   HEMATOCRIT % 57.6*   PLATELETS Thousands/uL 203   SEGS PCT % 87*   LYMPHO PCT % 6*   MONO PCT % 7   EOS PCT % 0     Results from last 7 days   Lab Units 12/17/24  2259   SODIUM mmol/L 127*   POTASSIUM mmol/L 5.0   CHLORIDE mmol/L 97   CO2 mmol/L 21   BUN mg/dL 60*   CREATININE mg/dL 2.56*   ANION GAP mmol/L 9   CALCIUM mg/dL 7.9*   ALBUMIN g/dL 3.7   TOTAL BILIRUBIN mg/dL 0.69   ALK PHOS U/L 71   ALT U/L 17   AST U/L 20   GLUCOSE RANDOM mg/dL 285*     Results from last 7 days   Lab Units  "12/18/24 0228   INR  1.82*         No results found for: \"HGBA1C\"  Results from last 7 days   Lab Units 12/18/24 0228   LACTIC ACID mmol/L 1.2       Imaging Results Review: I reviewed radiology reports from this admission including: CT chest and CT abdomen/pelvis.  Other Study Results Review: EKG was reviewed.  EKG was personally reviewed and my interpretation is: Atrial fibrillation. ..    Administrative Statements       ** Please Note: This note has been constructed using a voice recognition system. **    "

## 2024-12-18 NOTE — ASSESSMENT & PLAN NOTE
Presented to the ED with shortness of breath, URI symptoms including congestion cough since first week of December  Patient initially seen by urgent care and was started on doxycycline, no improvement was seen again on December 17 and was started on Augmentin, took 1 dose of  Patient denies any fevers or chills  WBC 13.38, lactic acid normal, flu RSV COVID-negative  CT chest:  Mild tree-in-bud nodularity in the right lower lobe suggestive of an infectious or inflammatory process. Bilateral lower lobe peribronchial thickening.  Patient given 1 dose of ceftriaxone in the ED    PLAN;  Continue ceftriaxone  Start azithromycin  Start Mucinex  Start Xopenex nebs plus albuterol inhaler  Follow-up on Legionella, strep pneumo  Follow-up on procalcitonin

## 2024-12-18 NOTE — ASSESSMENT & PLAN NOTE
Patient takes simvastatin 40 Mg daily at home    PLAN;  Start pravastatin 40 Mg daily while in the hospital

## 2024-12-18 NOTE — ASSESSMENT & PLAN NOTE
Patient complains of bloody urine for the past few days  Patient also complained of left-sided flank pain  Patient is not a smoker  Urinalysis shows moderate occult blood, urine microscopic shows 10-20 RBCs  CT abdomen: Fullness of the left renal collecting system with perinephric stranding, concerns for acute pyelonephritis  PSA December 2023 within normal limits (1.0)  Patient also has history of kidney stones, no kidney stone seen on the CT scan    PLAN:  Continue ceftriaxone  Hold Xarelto in the setting of active hematuria

## 2024-12-18 NOTE — SEPSIS NOTE
"  Sepsis Note   Leandro Blanchard 64 y.o. male MRN: [id number]  Unit/Bed#: ED-33 Encounter: [id number]       Initial Sepsis Screening       Row Name 12/18/24 0425 12/18/24 0424             Is the patient's history suggestive of a new or worsening infection? Yes (Proceed)  -MR Yes (Proceed)  -MR       Suspected source of infection urinary tract infection;pneumonia  -MR --       Indicate SIRS criteria Tachycardia > 90 bpm;Leukocytosis (WBC > 35670 IJL) OR Leukopenia (WBC <4000 IJL) OR Bandemia (WBC >10% bands)  -MR --       Are two or more of the above signs & symptoms of infection both present and new to the patient? Yes (Proceed)  -MR --       Assess for evidence of organ dysfunction: Are any of the below criteria present within 6 hours of suspected infection and SIRS criteria that are NOT considered to be chronic conditions? --  -MR --       Date of presentation of severe sepsis --  -MR --       Time of presentation of severe sepsis --  -MR --       Sepsis Note: Click \"NEXT\" below (NOT \"close\") to generate sepsis note based on above information. -- --                 User Key  (r) = Recorded By, (t) = Taken By, (c) = Cosigned By      Initials Name Provider Type    MR Thanh Lemon MD Resident                        Body mass index is 38.32 kg/m².  Wt Readings from Last 1 Encounters:   12/17/24 118 kg (259 lb 7.7 oz)     IBW (Ideal Body Weight): 70.7 kg    Ideal body weight: 70.7 kg (155 lb 13.8 oz)  Adjusted ideal body weight: 89.5 kg (197 lb 5 oz)    "

## 2024-12-18 NOTE — RESPIRATORY THERAPY NOTE
"RT Protocol Note  Leandro Blanchard 64 y.o. male MRN: [id number]  Unit/Bed#: ED-33 Encounter: [id number]    Assessment    Principal Problem:    Shortness of breath  Active Problems:    Hematuria    Stage 3b chronic kidney disease (HCC)    Type 2 diabetes mellitus (HCC)    A-fib (HCC)    HLD (hyperlipidemia)    Hyponatremia    Erythrocytosis      Home Pulmonary Medications:  None    Home Devices/Therapy: Other (Comment)    Past Medical History:   Diagnosis Date    A-fib (HCC)     Diabetes mellitus (HCC)     Kidney calculi      Social History     Socioeconomic History    Marital status: /Civil Union     Spouse name: None    Number of children: None    Years of education: None    Highest education level: None   Occupational History    None   Tobacco Use    Smoking status: Never    Smokeless tobacco: Never   Vaping Use    Vaping status: Never Used   Substance and Sexual Activity    Alcohol use: Never    Drug use: Never    Sexual activity: None   Other Topics Concern    None   Social History Narrative    None     Social Drivers of Health     Financial Resource Strain: Not on file   Food Insecurity: Not on file   Transportation Needs: Not on file   Physical Activity: Not on file   Stress: Not on file   Social Connections: Not on file   Intimate Partner Violence: Not on file   Housing Stability: Not on file       Subjective         Objective    Physical Exam:   Assessment Type: Assess only  General Appearance: Awake, Alert  Respiratory Pattern: Normal  Chest Assessment: Chest expansion symmetrical  Bilateral Breath Sounds: Diminished, Coarse, Rhonchi  O2 Device: RA    Vitals:  Blood pressure 134/74, pulse 94, temperature 97.5 °F (36.4 °C), resp. rate 18, height 5' 9\" (1.753 m), weight 118 kg (259 lb 7.7 oz), SpO2 97%.            O2 Device: RA     Plan             Resp Comments: Patient started with a cough week ago. Positive for productive cough. Patient has Hx of afib so hasnt used many nebulizers in the past due to " that. No pulmonary hx. Complains of SOB. Will increase to 1.25 xop at TID. Patient agreeable to plan to help with SOB and cough

## 2024-12-18 NOTE — ED ATTENDING ATTESTATION
12/17/2024  I, Brent Cool MD, saw and evaluated the patient. I have discussed the patient with the resident/non-physician practitioner and agree with the resident's/non-physician practitioner's findings, Plan of Care, and MDM as documented in the resident's/non-physician practitioner's note, except where noted. All available labs and Radiology studies were reviewed.  I was present for key portions of any procedure(s) performed by the resident/non-physician practitioner and I was immediately available to provide assistance.       At this point I agree with the current assessment done in the Emergency Department.  I have conducted an independent evaluation of this patient a history and physical is as follows: Patient is a 64 year old male with cough for about 1 week and was on doxycycline. Was last seen at [facility] on 12/17/24 for sore throat and sinobronchitis and was placed on augmentin. (+) left flank pain today. (+) nausea. No vomiting or diarrhea. No rash. Difficulty urinating. [facility] website checked on this patient and no Rx found. (+) ill contact. NCAT. No scleral icterus. Mucous membranes somewhat dry. Lungs clear. Irregularly irregular tachycardia without murmur. Abdomen soft and nontender. Good bowel sounds. (+) L CVAT. No edema. No rash noted. No jaundice. DDx including but not limited to: renal colic, pyelonephritis, UTI, GI etiology, appendicitis, diverticulitis, pancreatitis, cholecystitis, biliary colic, doubt AAA, rhabdomyolysis; tumor, zoster.   DDx including but not limited to:  URI, bronchitis, pneumonia, GERD, aspiration pneumonitis, viral illness, COVID 19, influenza; doubt smoke inhalation, CO poisoning, adverse medication reaction. Will check EKG, labs, CXR and CT. IVFs and IV dilaudid and IV zofran ordered.     ED Course         Critical Care Time  Procedures

## 2024-12-18 NOTE — ASSESSMENT & PLAN NOTE
Lab Results   Component Value Date    HCT 57.6 (H) 12/17/2024    HGB 18.0 (H) 12/17/2024      Patient takes testosterone supplements at home  Erythrocytosis can be secondary to testosterone supplements versus obstructive sleep apnea

## 2024-12-18 NOTE — ASSESSMENT & PLAN NOTE
Lab Results   Component Value Date    EGFR 25 12/17/2024    CREATININE 2.56 (H) 12/17/2024     Elevated creatinine, baseline 2.1-2.3  Patient takes losartan, farxiga, and Kerendia   Patient endorses decrease in appetite for the past 2 days    PLAN:  Hold losartan in the setting of elevated creatinine  Normal saline 75 mL/h for 12 hours  Continue to monitor BMP  Avoid nephrotoxic drugs

## 2024-12-18 NOTE — ED PROVIDER NOTES
Time reflects when diagnosis was documented in both MDM as applicable and the Disposition within this note       Time User Action Codes Description Comment    12/18/2024  4:32 AM IonThanh romero Add [J18.9] Pneumonia     12/18/2024  4:33 AM IonThanh romero Add [N39.0] UTI (urinary tract infection)     12/18/2024  4:33 AM IonThanh romero Add [E87.1] Hyponatremia     12/18/2024  4:33 AM IonThanh romero Add [R73.9] Hyperglycemia     12/18/2024  4:34 AM IonThanh Add [A41.9] Sepsis (HCC)     12/18/2024  4:34 AM IonThanh romero Modify [J18.9] Pneumonia     12/18/2024  4:34 AM IonThanh romero Modify [A41.9] Sepsis (HCC)           ED Disposition       ED Disposition   Admit    Condition   Stable    Date/Time   Wed Dec 18, 2024  4:45 AM    Comment                  Assessment & Plan       Medical Decision Making  64-year-old male history of A-fib on Xarelto, type 2 diabetes, CKD, distant history of kidney stones presenting due to URI symptoms for a week and left flank pain that started today.  Patient states the pain in his left flank feels like previous kidney stone.  Has not eaten much the last 2 days.  Pain is currently a 9/10 with mild nausea but no vomiting.  Patient has been dealing with the URI-like symptoms previously diagnosed with bronchitis and placed on doxycycline with no improvement.    Labs and urine collected, urine is not suggestive of infection but does have red blood cells concerning for kidney stone in conjunction with left flank pain.  Will evaluate with CT chest abdomen pelvis without contrast due to renal disease due.    Amount and/or Complexity of Data Reviewed  Labs: ordered. Decision-making details documented in ED Course.  Radiology: ordered. Decision-making details documented in ED Course.    Risk  Prescription drug management.  Decision regarding hospitalization.        ED Course as of 12/18/24 0737   Wed Dec 18, 2024   0134 RBC Urine(!): 10-20   0134 WBC(!): 13.38   0134 Segmented %(!): 87    0135 HX afib- xarelto T2DM, CKD, kidneys stone presenting due to URI 1 wk and L flank pain 1 d. Decreased appetite. Pain 9/10. Mild nausea   0135 FLU/RSV/COVID - if FLU/RSV clinically relevant (2hr TAT)  Neg   0136 Creatinine(!): 2.56   0136 GLUCOSE(!): 285  Had to skip manjauro for recent cataract surgery   0142 Blood Pressure: 130/66   0143 Temperature: 97.7 °F (36.5 °C)   0143 Pulse: 101   0143 Respirations: 12   0143 SpO2: 96 %   0418 CT chest abdomen pelvis wo contrast  1.  Limit evaluation due to motion artifact in the chest and lack of intravenous contrast.  2.  Mild tree-in-bud nodularity in the right lower lobe suggestive of an infectious or inflammatory process.  3.  Bilateral lower lobe peribronchial thickening.  4.  Fullness of the left renal collecting system with perinephric stranding. Although nonspecific, the findings can be seen with ascending urinary tract infection in the appropriate setting. Evaluation of the renal parenchyma for acute pyelonephritis is   limited without intravenous contrast.  5.  2.5 cm hypodense lesion in the interpolar left kidney likely a cyst but incompletely evaluated on this noncontrast study. Recommend correlation with nonemergent renal ultrasound.     0429 CT scan concerning for pneumonia as well as UTI.  Patient does meet sepsis criteria, discussed with inpatient team for admission and management.  Patient started on Rocephin and azithromycin in the emergency department.   0737 XR chest 1 view portable  My personal interpretation-mild consolidation in bilateral bases       Medications   azithromycin (ZITHROMAX) tablet 500 mg (500 mg Oral Given 12/18/24 0559)   insulin glargine (LANTUS) subcutaneous injection 40 Units 0.4 mL (has no administration in time range)   losartan (COZAAR) tablet 25 mg ( Oral Held Dose 12/21/24 2200)   metoprolol tartrate (LOPRESSOR) tablet 50 mg (has no administration in time range)   pravastatin (PRAVACHOL) tablet 40 mg (has no  administration in time range)   rivaroxaban (XARELTO) tablet 20 mg ( Oral Held Dose 12/21/24 0730)   insulin lispro (HumALOG/ADMELOG) 100 units/mL subcutaneous injection 2-12 Units (has no administration in time range)   insulin lispro (HumALOG/ADMELOG) 100 units/mL subcutaneous injection 2-12 Units (has no administration in time range)   guaiFENesin (MUCINEX) 12 hr tablet 600 mg (has no administration in time range)   sodium chloride 0.9 % infusion (75 mL/hr Intravenous New Bag 12/18/24 0603)   ceftriaxone (ROCEPHIN) 1 g/50 mL in dextrose IVPB (has no administration in time range)   levalbuterol (XOPENEX) inhalation solution 0.63 mg (0.63 mg Nebulization Given 12/18/24 0606)   sodium chloride 0.9 % bolus 500 mL (0 mL Intravenous Stopped 12/18/24 0415)   ondansetron (ZOFRAN) injection 4 mg (4 mg Intravenous Given 12/18/24 0243)   HYDROmorphone (DILAUDID) injection 0.5 mg (0.5 mg Intravenous Given 12/18/24 0246)   ceftriaxone (ROCEPHIN) 1 g/50 mL in dextrose IVPB (0 mg Intravenous Stopped 12/18/24 0542)       ED Risk Strat Scores                          SBIRT 20yo+      Flowsheet Row Most Recent Value   Initial Alcohol Screen: US AUDIT-C     1. How often do you have a drink containing alcohol? 0 Filed at: 12/17/2024 2256   2. How many drinks containing alcohol do you have on a typical day you are drinking?  0 Filed at: 12/17/2024 2256   3a. Male UNDER 65: How often do you have five or more drinks on one occasion? 0 Filed at: 12/17/2024 2256   3b. FEMALE Any Age, or MALE 65+: How often do you have 4 or more drinks on one occassion? 0 Filed at: 12/17/2024 2256   Audit-C Score 0 Filed at: 12/17/2024 2256   ANDREW: How many times in the past year have you...    Used an illegal drug or used a prescription medication for non-medical reasons? Never Filed at: 12/17/2024 2256                            History of Present Illness       Chief Complaint   Patient presents with    Flank Pain     L sided flank pain starting  tonight, seen at urgent care and given augmentin and told to start gas x. Pt reports no relief and pain getting worse. Distant hx of kidney stones, feels similar.     URI     URI symptoms for a few days, started on doxy on Saturday, reports no improvement in symptoms, returned to urgent care today and also had flank pain       Past Medical History:   Diagnosis Date    A-fib (HCC)     Diabetes mellitus (HCC)     Kidney calculi       Past Surgical History:   Procedure Laterality Date    CATARACT EXTRACTION        History reviewed. No pertinent family history.   Social History     Tobacco Use    Smoking status: Never    Smokeless tobacco: Never   Vaping Use    Vaping status: Never Used   Substance Use Topics    Alcohol use: Never    Drug use: Never      E-Cigarette/Vaping    E-Cigarette Use Never User       E-Cigarette/Vaping Substances      I have reviewed and agree with the history as documented.     64-year-old male history of A-fib on Xarelto, type 2 diabetes, CKD, distant history of kidney stones presenting due to URI symptoms for a week and left flank pain that started today.  Patient states the pain in his left flank feels like previous kidney stone.  Has not eaten much the last 2 days.  Pain is currently a 9/10 with mild nausea but no vomiting.  Patient has been dealing with the URI-like symptoms previously diagnosed with bronchitis and placed on doxycycline with no improvement.          Review of Systems   Respiratory:  Positive for cough.    Cardiovascular:  Negative for chest pain.   Gastrointestinal:  Positive for nausea. Negative for abdominal pain and vomiting.   Genitourinary:  Positive for flank pain.           Objective       ED Triage Vitals   Temperature Pulse Blood Pressure Respirations SpO2 Patient Position - Orthostatic VS   12/18/24 0132 12/17/24 2250 12/17/24 2250 12/17/24 2250 12/17/24 2250 12/17/24 2250   97.7 °F (36.5 °C) 101 130/66 12 96 % Sitting      Temp Source Heart Rate Source BP  "Location FiO2 (%) Pain Score    12/18/24 0132 12/17/24 2250 12/17/24 2250 -- 12/18/24 0246    Oral Monitor Right arm  9      Vitals      Date and Time Temp Pulse SpO2 Resp BP Pain Score FACES Pain Rating User   12/18/24 0600 -- 100 96 % 16 119/71 -- -- CG   12/18/24 0326 -- 93 94 % 16 109/70 7 -- CG   12/18/24 0246 -- -- -- -- -- 9 -- CG   12/18/24 0132 97.7 °F (36.5 °C) -- -- -- -- -- -- CG   12/17/24 2250 -- 101 96 % 12 130/66 -- -- AR            Physical Exam  Vitals and nursing note reviewed.   Constitutional:       General: He is in acute distress.      Appearance: He is well-developed.   HENT:      Head: Normocephalic and atraumatic.      Nose: Nose normal. No congestion.      Mouth/Throat:      Pharynx: Oropharynx is clear.   Eyes:      Extraocular Movements: Extraocular movements intact.      Conjunctiva/sclera: Conjunctivae normal.   Cardiovascular:      Rate and Rhythm: Normal rate and regular rhythm.      Pulses: Normal pulses.      Heart sounds: Normal heart sounds. No murmur heard.  Pulmonary:      Effort: Pulmonary effort is normal. No respiratory distress.      Breath sounds: Normal breath sounds.   Chest:      Chest wall: No tenderness.   Abdominal:      General: Abdomen is flat. Bowel sounds are normal. There is no distension.      Palpations: Abdomen is soft.      Tenderness: There is no abdominal tenderness. There is left CVA tenderness. There is no right CVA tenderness.   Musculoskeletal:         General: No deformity or signs of injury. Normal range of motion.      Cervical back: Normal range of motion and neck supple. No rigidity or tenderness.   Skin:     General: Skin is warm and dry.   Neurological:      General: No focal deficit present.      Mental Status: He is alert.         Results Reviewed       Procedure Component Value Units Date/Time    Osmolality-\"If this is regarding a toxic alcohol, STOP. Test is not routinely indicated. Please consult medical  on call for further " "guidance.\" [[id number]]  (Abnormal) Collected: 12/18/24 0541    Lab Status: Final result Specimen: Blood from Arm, Left Updated: 12/18/24 0713     Osmolality Serum 299 mmol/KG     Basic metabolic panel [[id number]]  (Abnormal) Collected: 12/18/24 0541    Lab Status: Final result Specimen: Blood from Arm, Left Updated: 12/18/24 0646     Sodium 126 mmol/L      Potassium 5.9 mmol/L      Chloride 98 mmol/L      CO2 20 mmol/L      ANION GAP 8 mmol/L      BUN 61 mg/dL      Creatinine 2.75 mg/dL      Glucose 172 mg/dL      Calcium 7.8 mg/dL      eGFR 22 ml/min/1.73sq m     Narrative:      National Kid[employer] Foundation guidelines for Chronic Kidney Disease (CKD):     Stage 1 with normal or high GFR (GFR > 90 mL/min/1.73 square meters)    Stage 2 Mild CKD (GFR = 60-89 mL/min/1.73 square meters)    Stage 3A Moderate CKD (GFR = 45-59 mL/min/1.73 square meters)    Stage 3B Moderate CKD (GFR = 30-44 mL/min/1.73 square meters)    Stage 4 Severe CKD (GFR = 15-29 mL/min/1.73 square meters)    Stage 5 End Stage CKD (GFR <15 mL/min/1.73 square meters)  Note: GFR calculation is accurate only with a steady state creatinine    Procalcitonin [[id number]]  (Normal) Collected: 12/18/24 0541    Lab Status: Final result Specimen: Blood from Arm, Left Updated: 12/18/24 0635     Procalcitonin 0.23 ng/ml     CBC and differential [[id number]]  (Abnormal) Collected: 12/18/24 0541    Lab Status: Final result Specimen: Blood from Arm, Left Updated: 12/18/24 0602     WBC 12.66 Thousand/uL      RBC 6.42 Million/uL      Hemoglobin 17.5 g/dL      Hematocrit 53.8 %      MCV 84 fL      MCH 27.3 pg      MCHC 32.5 g/dL      RDW 15.5 %      MPV 10.6 fL      Platelets 229 Thousands/uL      nRBC 0 /100 WBCs      Segmented % 84 %      Immature Grans % 0 %      Lymphocytes % 7 %      Monocytes % 9 %      Eosinophils Relative 0 %      Basophils Relative 0 %      Absolute Neutrophils 10.60 Thousands/µL      Absolute Immature Grans 0.04 Thousand/uL      Absolute " Lymphocytes 0.84 Thousands/µL      Absolute Monocytes 1.12 Thousand/µL      Eosinophils Absolute 0.02 Thousand/µL      Basophils Absolute 0.04 Thousands/µL     Hemoglobin A1c w/EAG Estimation (Prechecked if no A1C within 90 days) [[id number]] Collected: 12/18/24 0541    Lab Status: In process Specimen: Blood from Arm, Left Updated: 12/18/24 0557    Osmolality, urine [[id number]]     Lab Status: No result Specimen: Urine     Sodium, urine, random [[id number]]     Lab Status: No result Specimen: Urine     Legionella antigen, urine [[id number]]     Lab Status: No result Specimen: Urine     Strep Pneumoniae, Urine [[id number]]     Lab Status: No result Specimen: Urine     Urine culture [[id number]]     Lab Status: No result Specimen: Urine, Clean Catch     Lactic acid, plasma (w/reflex if result > 2.0) [[id number]]  (Normal) Collected: 12/18/24 0228    Lab Status: Final result Specimen: Blood from Arm, Left Updated: 12/18/24 0306     LACTIC ACID 1.2 mmol/L     Narrative:      Specimen Lipemic.  Result may be elevated if tourniquet was used during collection.    Protime-INR [[id number]]  (Abnormal) Collected: 12/18/24 0228    Lab Status: Final result Specimen: Blood from Arm, Left Updated: 12/18/24 0254     Protime 21.8 seconds      INR 1.82    Narrative:      INR Therapeutic Range    Indication                                             INR Range      Atrial Fibrillation                                               2.0-3.0  Hypercoagulable State                                    2.0.2.3  Left Ventricular Asist Device                            2.0-3.0  Mechanical Heart Valve                                  -    Aortic(with afib, MI, embolism, HF, LA enlargement,    and/or coagulopathy)                                     2.0-3.0 (2.5-3.5)     Mitral                                                             2.5-3.5  Prosthetic/Bioprosthetic Heart Valve               2.0-3.0  Venous thromboembolism (VTE: VT, PE         2.0-3.0    APTT [[id number]]  (Normal) Collected: 12/18/24 0228    Lab Status: Final result Specimen: Blood from Arm, Left Updated: 12/18/24 0254     PTT 33 seconds     Magnesium [[id number]]  (Normal) Collected: 12/17/24 2259    Lab Status: Final result Specimen: Blood from Arm, Left Updated: 12/18/24 0207     Magnesium 2.4 mg/dL     CK [[id number]]  (Normal) Collected: 12/17/24 2259    Lab Status: Final result Specimen: Blood from Arm, Left Updated: 12/18/24 0207     Total CK 72 U/L     Urine Microscopic [[id number]]  (Abnormal) Collected: 12/18/24 0108    Lab Status: Final result Specimen: Urine, Other Updated: 12/18/24 0123     RBC, UA 10-20 /hpf      WBC, UA None Seen /hpf      Epithelial Cells Occasional /hpf      Bacteria, UA None Seen /hpf     UA w Reflex to Microscopic w Reflex to Culture [[id number]]  (Abnormal) Collected: 12/18/24 0108    Lab Status: Final result Specimen: Urine, Other Updated: 12/18/24 0121     Color, UA Light Yellow     Clarity, UA Clear     Specific Gravity, UA 1.024     pH, UA 5.0     Leukocytes, UA Elevated glucose may cause decreased leukocyte values. See urine microscopic for UWBC result     Nitrite, UA Negative     Protein, UA Negative mg/dl      Glucose, UA >=1000 (1%) mg/dl      Ketones, UA Negative mg/dl      Urobilinogen, UA <2.0 mg/dl      Bilirubin, UA Negative     Occult Blood, UA Moderate    FLU/RSV/COVID - if FLU/RSV clinically relevant (2hr TAT) [[id number]]  (Normal) Collected: 12/17/24 2259    Lab Status: Final result Specimen: Nares from Nose Updated: 12/17/24 2345     SARS-CoV-2 Negative     INFLUENZA A PCR Negative     INFLUENZA B PCR Negative     RSV PCR Negative    Narrative:      This test has been performed using the CoV-2/Flu/RSV plus assay on the Cepheid GeneXpert platform. This test has been validated by the  and verified by the performing laboratory.     This test is designed to amplify and detect the following: nucleocapsid (N), envelope (E), and  RNA-dependent RNA polymerase (RdRP) genes of the SARS-CoV-2 genome; matrix (M), basic polymerase (PB2), and acidic protein (PA) segments of the influenza A genome; matrix (M) and non-structural protein (NS) segments of the influenza B genome, and the nucleocapsid genes of RSV A and RSV B.     Positive results are indicative of the presence of Flu A, Flu B, RSV, and/or SARS-CoV-2 RNA. Positive results for SARS-CoV-2 or suspected novel influenza should be reported to state, local, or federal health departments according to local reporting requirements.      All results should be assessed in conjunction with clinical presentation and other laboratory markers for clinical management.     FOR PEDIATRIC PATIENTS - copy/paste COVID Guidelines URL to browser: [email or web address]       Comprehensive metabolic panel [[id number]]  (Abnormal) Collected: 12/17/24 2259    Lab Status: Final result Specimen: Blood from Arm, Left Updated: 12/17/24 2327     Sodium 127 mmol/L      Potassium 5.0 mmol/L      Chloride 97 mmol/L      CO2 21 mmol/L      ANION GAP 9 mmol/L      BUN 60 mg/dL      Creatinine 2.56 mg/dL      Glucose 285 mg/dL      Calcium 7.9 mg/dL      AST 20 U/L      ALT 17 U/L      Alkaline Phosphatase 71 U/L      Total Protein 6.0 g/dL      Albumin 3.7 g/dL      Total Bilirubin 0.69 mg/dL      eGFR 25 ml/min/1.73sq m     Narrative:      National Kidney Disease Foundation guidelines for Chronic Kidney Disease (CKD):     Stage 1 with normal or high GFR (GFR > 90 mL/min/1.73 square meters)    Stage 2 Mild CKD (GFR = 60-89 mL/min/1.73 square meters)    Stage 3A Moderate CKD (GFR = 45-59 mL/min/1.73 square meters)    Stage 3B Moderate CKD (GFR = 30-44 mL/min/1.73 square meters)    Stage 4 Severe CKD (GFR = 15-29 mL/min/1.73 square meters)    Stage 5 End Stage CKD (GFR <15 mL/min/1.73 square meters)  Note: GFR calculation is accurate only with a steady state creatinine     Lipase [[id number]]  (Normal) Collected: 12/17/24 2259    Lab Status: Final result Specimen: Blood from Arm, Left Updated: 12/17/24 2327     Lipase 49 u/L     CBC and differential [[id number]]  (Abnormal) Collected: 12/17/24 2259    Lab Status: Final result Specimen: Blood from Arm, Left Updated: 12/17/24 2325     WBC 13.38 Thousand/uL      RBC 6.62 Million/uL      Hemoglobin 18.0 g/dL      Hematocrit 57.6 %      MCV 86 fL      MCH 27.2 pg      MCHC 31.7 g/dL      RDW 15.6 %      MPV 10.0 fL      Platelets 203 Thousands/uL      nRBC 0 /100 WBCs      Segmented % 87 %      Immature Grans % 0 %      Lymphocytes % 6 %      Monocytes % 7 %      Eosinophils Relative 0 %      Basophils Relative 0 %      Absolute Neutrophils 11.51 Thousands/µL      Absolute Immature Grans 0.05 Thousand/uL      Absolute Lymphocytes 0.78 Thousands/µL      Absolute Monocytes 0.97 Thousand/µL      Eosinophils Absolute 0.02 Thousand/µL      Basophils Absolute 0.05 Thousands/µL             CT chest abdomen pelvis wo contrast   Final Interpretation by Jessy Pinzon MD (12/18 5207)      1.  Limit evaluation due to motion artifact in the chest and lack of intravenous contrast.   2.  Mild tree-in-bud nodularity in the right lower lobe suggestive of an infectious or inflammatory process.   3.  Bilateral lower lobe peribronchial thickening.   4.  Fullness of the left renal collecting system with perinephric stranding. Although nonspecific, the findings can be seen with ascending urinary tract infection in the appropriate setting. Evaluation of the renal parenchyma for acute pyelonephritis is    limited without intravenous contrast.   5.  2.5 cm hypodense lesion in the interpolar left kidney likely a cyst but incompletely evaluated on this noncontrast study. Recommend correlation with nonemergent renal ultrasound.               Workstation performed: [id number]         XR chest 1 view portable   ED Interpretation by Thanh Lemon MD (12/18 7588)   My  personal interpretation-mild consolidation in bilateral bases          ECG 12 Lead Documentation Only    Date/Time: 12/18/2024 2:55 AM    Performed by: Thanh Lemon MD  Authorized by: Thanh Lemon MD    Patient location:  ED  Interpretation:     Interpretation: abnormal    Rate:     ECG rate:  103    ECG rate assessment: tachycardic    Rhythm:     Rhythm: atrial fibrillation    Ectopy:     Ectopy: none    QRS:     QRS axis:  Normal    QRS intervals:  Normal  Conduction:     Conduction: abnormal      Abnormal conduction: complete RBBB    ST segments:     ST segments:  Normal  T waves:     T waves: normal    Other findings:     Other findings: prolonged qTc interval        ED Medication and Procedure Management   None     Patient's Medications    No medications on file     No discharge procedures on file.  ED SEPSIS DOCUMENTATION   Time reflects when diagnosis was documented in both MDM as applicable and the Disposition within this note       Time User Action Codes Description Comment    12/18/2024  4:32 AM Thanh Lemon Add [J18.9] Pneumonia     12/18/2024  4:33 AM Thanh Lemon Add [N39.0] UTI (urinary tract infection)     12/18/2024  4:33 AM Thanh Lemon Add [E87.1] Hyponatremia     12/18/2024  4:33 AM Thanh Lemon R Add [R73.9] Hyperglycemia     12/18/2024  4:34 AM IonThanh romero Add [A41.9] Sepsis (HCC)     12/18/2024  4:34 AM IonThanh romero R Modify [J18.9] Pneumonia     12/18/2024  4:34 AM IonThanh romero R Modify [A41.9] Sepsis (HCC)            Initial Sepsis Screening       Row Name 12/18/24 0425 12/18/24 0424             Is the patient's history suggestive of a new or worsening infection? Yes (Proceed)  -MR Yes (Proceed)  -MR       Suspected source of infection urinary tract infection;pneumonia  -MR --       Indicate SIRS criteria Tachycardia > 90 bpm;Leukocytosis (WBC > 87805 IJL) OR Leukopenia (WBC <4000 IJL) OR Bandemia (WBC >10% bands)  -MR --       Are two or more of the above signs & symptoms  "of infection both present and new to the patient? Yes (Proceed)  -MR --       Assess for evidence of organ dysfunction: Are any of the below criteria present within 6 hours of suspected infection and SIRS criteria that are NOT considered to be chronic conditions? --  -MR --       Date of presentation of severe sepsis --  -MR --       Time of presentation of severe sepsis --  -MR --       Sepsis Note: Click \"NEXT\" below (NOT \"close\") to generate sepsis note based on above information. -- --                 User Key  (r) = Recorded By, (t) = Taken By, (c) = Cosigned By      Initials Name Provider Type    MR Thanh Lemon MD Resident                       Thanh Lemon MD  12/18/24 0737    "

## 2024-12-19 LAB
ANION GAP SERPL CALCULATED.3IONS-SCNC: 10 MMOL/L (ref 4–13)
BACTERIA UR CULT: NORMAL
BUN SERPL-MCNC: 71 MG/DL (ref 5–25)
CALCIUM SERPL-MCNC: 8.1 MG/DL (ref 8.4–10.2)
CHLORIDE SERPL-SCNC: 99 MMOL/L (ref 96–108)
CO2 SERPL-SCNC: 22 MMOL/L (ref 21–32)
CREAT SERPL-MCNC: 3.18 MG/DL (ref 0.6–1.3)
ERYTHROCYTE [DISTWIDTH] IN BLOOD BY AUTOMATED COUNT: 16.9 % (ref 11.6–15.1)
GFR SERPL CREATININE-BSD FRML MDRD: 19 ML/MIN/1.73SQ M
GLUCOSE SERPL-MCNC: 141 MG/DL (ref 65–140)
GLUCOSE SERPL-MCNC: 143 MG/DL (ref 65–140)
GLUCOSE SERPL-MCNC: 144 MG/DL (ref 65–140)
GLUCOSE SERPL-MCNC: 79 MG/DL (ref 65–140)
HCT VFR BLD AUTO: 53.7 % (ref 36.5–49.3)
HGB BLD-MCNC: 17.4 G/DL (ref 12–17)
MCH RBC QN AUTO: 27.4 PG (ref 26.8–34.3)
MCHC RBC AUTO-ENTMCNC: 32.4 G/DL (ref 31.4–37.4)
MCV RBC AUTO: 84 FL (ref 82–98)
PLATELET # BLD AUTO: 205 THOUSANDS/UL (ref 149–390)
PMV BLD AUTO: 10.7 FL (ref 8.9–12.7)
POTASSIUM SERPL-SCNC: 4.6 MMOL/L (ref 3.5–5.3)
RBC # BLD AUTO: 6.36 MILLION/UL (ref 3.88–5.62)
SODIUM SERPL-SCNC: 131 MMOL/L (ref 135–147)
WBC # BLD AUTO: 9.01 THOUSAND/UL (ref 4.31–10.16)

## 2024-12-19 PROCEDURE — 85027 COMPLETE CBC AUTOMATED: CPT

## 2024-12-19 PROCEDURE — 99232 SBSQ HOSP IP/OBS MODERATE 35: CPT | Performed by: HOSPITALIST

## 2024-12-19 PROCEDURE — 94760 N-INVAS EAR/PLS OXIMETRY 1: CPT

## 2024-12-19 PROCEDURE — 82948 REAGENT STRIP/BLOOD GLUCOSE: CPT

## 2024-12-19 PROCEDURE — 94640 AIRWAY INHALATION TREATMENT: CPT

## 2024-12-19 PROCEDURE — 80048 BASIC METABOLIC PNL TOTAL CA: CPT

## 2024-12-19 RX ORDER — SODIUM CHLORIDE, SODIUM GLUCONATE, SODIUM ACETATE, POTASSIUM CHLORIDE, MAGNESIUM CHLORIDE, SODIUM PHOSPHATE, DIBASIC, AND POTASSIUM PHOSPHATE .53; .5; .37; .037; .03; .012; .00082 G/100ML; G/100ML; G/100ML; G/100ML; G/100ML; G/100ML; G/100ML
100 INJECTION, SOLUTION INTRAVENOUS CONTINUOUS
Status: DISCONTINUED | OUTPATIENT
Start: 2024-12-19 | End: 2024-12-20 | Stop reason: HOSPADM

## 2024-12-19 RX ADMIN — LEVALBUTEROL HYDROCHLORIDE 1.25 MG: 1.25 SOLUTION RESPIRATORY (INHALATION) at 07:51

## 2024-12-19 RX ADMIN — SODIUM CHLORIDE, SODIUM GLUCONATE, SODIUM ACETATE, POTASSIUM CHLORIDE, MAGNESIUM CHLORIDE, SODIUM PHOSPHATE, DIBASIC, AND POTASSIUM PHOSPHATE 100 ML/HR: .53; .5; .37; .037; .03; .012; .00082 INJECTION, SOLUTION INTRAVENOUS at 10:17

## 2024-12-19 RX ADMIN — INSULIN GLARGINE 40 UNITS: 100 INJECTION, SOLUTION SUBCUTANEOUS at 08:17

## 2024-12-19 RX ADMIN — INSULIN GLARGINE 40 UNITS: 100 INJECTION, SOLUTION SUBCUTANEOUS at 22:28

## 2024-12-19 RX ADMIN — METOPROLOL TARTRATE 50 MG: 50 TABLET, FILM COATED ORAL at 22:28

## 2024-12-19 RX ADMIN — LEVALBUTEROL HYDROCHLORIDE 1.25 MG: 1.25 SOLUTION RESPIRATORY (INHALATION) at 13:47

## 2024-12-19 RX ADMIN — LEVALBUTEROL HYDROCHLORIDE 1.25 MG: 1.25 SOLUTION RESPIRATORY (INHALATION) at 19:10

## 2024-12-19 RX ADMIN — SODIUM CHLORIDE, SODIUM GLUCONATE, SODIUM ACETATE, POTASSIUM CHLORIDE, MAGNESIUM CHLORIDE, SODIUM PHOSPHATE, DIBASIC, AND POTASSIUM PHOSPHATE 100 ML/HR: .53; .5; .37; .037; .03; .012; .00082 INJECTION, SOLUTION INTRAVENOUS at 21:08

## 2024-12-19 RX ADMIN — METOPROLOL TARTRATE 50 MG: 50 TABLET, FILM COATED ORAL at 08:17

## 2024-12-19 RX ADMIN — GUAIFENESIN 600 MG: 600 TABLET ORAL at 22:28

## 2024-12-19 RX ADMIN — AZITHROMYCIN DIHYDRATE 500 MG: 250 TABLET ORAL at 05:03

## 2024-12-19 RX ADMIN — GUAIFENESIN 600 MG: 600 TABLET ORAL at 08:17

## 2024-12-19 RX ADMIN — ACETAMINOPHEN 975 MG: 325 TABLET, FILM COATED ORAL at 18:09

## 2024-12-19 RX ADMIN — PRAVASTATIN SODIUM 40 MG: 40 TABLET ORAL at 18:00

## 2024-12-19 NOTE — ASSESSMENT & PLAN NOTE
Presented to the ED with shortness of breath, URI symptoms including congestion cough since first week of December  Patient initially seen by urgent care and was started on doxycycline, no improvement was seen again on December 17 and was started on Augmentin, took 1 dose  Patient denies any fevers or chills  WBC improving, lactic acid normal, flu RSV COVID-negative  CT chest:  Mild tree-in-bud nodularity in the right lower lobe suggestive of an infectious or inflammatory process. Bilateral lower lobe peribronchial thickening.  Legionella, strep pneumo negative    PLAN;  Patient has already completed antibiotics for pneumoa. Will discontinue further antibiotic treatment.  Continue mucinex, xopenex

## 2024-12-19 NOTE — PLAN OF CARE
Problem: RESPIRATORY - ADULT  Goal: Achieves optimal ventilation and oxygenation  Description: INTERVENTIONS:  - Assess for changes in respiratory status  - Assess for changes in mentation and behavior  - Position to facilitate oxygenation and minimize respiratory effort  - Oxygen administered by appropriate delivery if ordered  - Initiate smoking cessation education as indicated  - Encourage broncho-pulmonary hygiene including cough, deep breathe, Incentive Spirometry  - Assess the need for suctioning and aspirate as needed  - Assess and instruct to report SOB or any respiratory difficulty  - Respiratory Therapy support as indicated  Outcome: Progressing     Problem: GENITOURINARY - ADULT  Goal: Maintains or returns to baseline urinary function  Description: INTERVENTIONS:  - Assess urinary function  - Encourage oral fluids to ensure adequate hydration if ordered  - Administer IV fluids as ordered to ensure adequate hydration  - Administer ordered medications as needed  - Offer frequent toileting  - Follow urinary retention protocol if ordered  Outcome: Progressing     Problem: HEMATOLOGIC - ADULT  Goal: Maintains hematologic stability  Description: INTERVENTIONS  - Assess for signs and symptoms of bleeding or hemorrhage  - Monitor labs  - Administer supportive blood products/factors as ordered and appropriate  Outcome: Progressing

## 2024-12-19 NOTE — PLAN OF CARE
Problem: Potential for Falls  Goal: Patient will remain free of falls  Description: INTERVENTIONS:  - Educate patient/family on patient safety including physical limitations  - Instruct patient to call for assistance with activity   - Consult OT/PT to assist with strengthening/mobility   - Keep Call bell within reach  - Keep bed low and locked with side rails adjusted as appropriate  - Keep care items and personal belongings within reach  - Initiate and maintain comfort rounds  - Make Fall Risk Sign visible to staff  - Offer Toileting every 2 Hours, in advance of need  - Initiate/Maintain bedalarm  - Obtain necessary fall risk management equipment:   - Apply yellow socks and bracelet for high fall risk patients  - Consider moving patient to room near nurses station  12/19/2024 0406 by Leila Short RN  Outcome: Progressing  12/19/2024 0405 by Leila Short, RN  Outcome: Progressing

## 2024-12-19 NOTE — ASSESSMENT & PLAN NOTE
Patient complains of bloody urine for the past few days  Patient also complained of left-sided flank pain  No smoking history  Urinalysis shows moderate occult blood, urine microscopic shows 10-20 RBCs  CT abdomen: Fullness of the left renal collecting system with perinephric stranding, concerns for acute pyelonephritis  PSA December 2023 within normal limits (1.0)  Patient also has history of kidney stones, no kidney stone seen on the CT scan    PLAN:  Patient passed stone and has no further hematuria  Discontinue antibiotic treatment as patient has no further urinary symptoms

## 2024-12-19 NOTE — ASSESSMENT & PLAN NOTE
Last A1c September, 2024 was 7.0    Blood Sugar Average: Last 72 hrs:  (P) 190.25  Patient takes detemir 40 units twice daily, Farxiga    PLAN;  Start Lantus 40 units twice daily  Sliding scale

## 2024-12-19 NOTE — PROGRESS NOTES
Progress Note - Hospitalist   Name: Leandro Blanchard 64 y.o. male I MRN: [id number]  Unit/Bed#: S -01 I Date of Admission: 12/18/2024   Date of Service: 12/19/2024 I Hospital Day: 1    Assessment & Plan  Shortness of breath  Presented to the ED with shortness of breath, URI symptoms including congestion cough since first week of December  Patient initially seen by urgent care and was started on doxycycline, no improvement was seen again on December 17 and was started on Augmentin, took 1 dose  Patient denies any fevers or chills  WBC improving, lactic acid normal, flu RSV COVID-negative  CT chest:  Mild tree-in-bud nodularity in the right lower lobe suggestive of an infectious or inflammatory process. Bilateral lower lobe peribronchial thickening.  Legionella, strep pneumo negative    PLAN;  Patient has already completed antibiotics for pneumoa. Will discontinue further antibiotic treatment.  Continue mucinex, xopenex     Hematuria  Patient complains of bloody urine for the past few days  Patient also complained of left-sided flank pain  No smoking history  Urinalysis shows moderate occult blood, urine microscopic shows 10-20 RBCs  CT abdomen: Fullness of the left renal collecting system with perinephric stranding, concerns for acute pyelonephritis  PSA December 2023 within normal limits (1.0)  Patient also has history of kidney stones, no kidney stone seen on the CT scan    PLAN:  Patient passed stone and has no further hematuria  Discontinue antibiotic treatment as patient has no further urinary symptoms  Stage 3b chronic kidney disease (HCC)  Lab Results   Component Value Date    EGFR 19 12/19/2024    EGFR 21 12/18/2024    EGFR 22 12/18/2024    CREATININE 3.18 (H) 12/19/2024    CREATININE 2.87 (H) 12/18/2024    CREATININE 2.75 (H) 12/18/2024     Elevated creatinine, baseline 2.1-2.3  Patient takes losartan, farxiga, and Kerendia   Patient endorses decrease in appetite for the past 2 days PTA    PLAN:  Hold  losartan in the setting of elevated creatinine  Plasmalyte 100 mL/hr IVF  Continue to monitor BMP  Avoid nephrotoxic drugs  Type 2 diabetes mellitus (HCC)  Last A1c September, 2024 was 7.0    Blood Sugar Average: Last 72 hrs:  (P) 190.25  Patient takes detemir 40 units twice daily, Farxiga    PLAN;  Start Lantus 40 units twice daily  Sliding scale  A-fib (HCC)  Continue metoprolol 50 Mg twice daily  Hold Xarelto in the setting of hematuria  HLD (hyperlipidemia)  Patient takes simvastatin 40 Mg daily at home    PLAN;  Start pravastatin 40 Mg daily while in the hospital  Hyponatremia  Patient's sodium at 127  Can be secondary to decreased oral intake versus pneumonia    PLAN:  Follow-up with urine osmole, urine osmole, urine sodium  Follow-up with Legionella urine  Normal saline 75 mL/h for 12 hours  Erythrocytosis  Lab Results   Component Value Date    HCT 53.8 (H) 12/18/2024    HGB 17.5 (H) 12/18/2024      Patient takes testosterone supplements at home  Erythrocytosis can be secondary to testosterone supplements versus obstructive sleep apnea    VTE Pharmacologic Prophylaxis: VTE Score: 3 Moderate Risk (Score 3-4) - Pharmacological DVT Prophylaxis Contraindicated. Sequential Compression Devices Ordered.    Mobility:   Basic Mobility Inpatient Raw Score: 24  JH-HLM Goal: 8: Walk 250 feet or more  JH-HLM Achieved: 8: Walk 250 feet ot more  JH-HLM Goal achieved. Continue to encourage appropriate mobility.    Patient Centered Rounds: I performed bedside rounds with nursing staff today.   Discussions with Specialists or Other Care Team Provider:     Education and Discussions with Family / Patient: Updated  (wife) at bedside.    Current Length of Stay: 1 day(s)  Current Patient Status: Inpatient   Certification Statement: The patient will continue to require additional inpatient hospital stay due to elevated creatinine  Discharge Plan: Anticipate discharge in 24-48 hrs to home.    Code Status: Level 1 - Full  Code    Subjective   Overnight patient passed stone in urine. He reports relief of flank pain since that time. He feels much better today overall. He has no complaints.    Objective :  Temp:  [97.5 °F (36.4 °C)-98.4 °F (36.9 °C)] 97.7 °F (36.5 °C)  HR:  [] 90  BP: (111-134)/(66-79) 112/77  Resp:  [17-19] 19  SpO2:  [93 %-98 %] 93 %  O2 Device: None (Room air)    Body mass index is 38.32 kg/m².     Input and Output Summary (last 24 hours):     Intake/Output Summary (Last 24 hours) at 12/19/2024 0637  Last data filed at 12/19/2024 0443  Gross per 24 hour   Intake --   Output 1550 ml   Net -1550 ml       Physical Exam  Vitals reviewed.   Constitutional:       Appearance: Normal appearance.   HENT:      Head: Normocephalic and atraumatic.      Mouth/Throat:      Mouth: Mucous membranes are moist.   Eyes:      General: No scleral icterus.     Extraocular Movements: Extraocular movements intact.      Conjunctiva/sclera: Conjunctivae normal.      Pupils: Pupils are equal, round, and reactive to light.   Cardiovascular:      Rate and Rhythm: Normal rate and regular rhythm.      Pulses: Normal pulses.      Heart sounds: Normal heart sounds. No murmur heard.     No friction rub. No gallop.   Pulmonary:      Breath sounds: Wheezing present. No rhonchi or rales.   Abdominal:      General: Bowel sounds are normal. There is no distension.      Palpations: Abdomen is soft.      Tenderness: There is no abdominal tenderness.   Musculoskeletal:         General: No swelling or tenderness. Normal range of motion.      Cervical back: Normal range of motion and neck supple.   Skin:     General: Skin is warm and dry.   Neurological:      Mental Status: He is alert and oriented to person, place, and time.   Psychiatric:         Mood and Affect: Mood normal.         Behavior: Behavior normal.         Lines/Drains:              Lab Results: I have reviewed the following results:   Results from last 7 days   Lab Units 12/18/24  0584    WBC Thousand/uL 12.66*   HEMOGLOBIN g/dL 17.5*   HEMATOCRIT % 53.8*   PLATELETS Thousands/uL 229   SEGS PCT % 84*   LYMPHO PCT % 7*   MONO PCT % 9   EOS PCT % 0     Results from last 7 days   Lab Units 12/18/24  1001 12/18/24  0541 12/17/24  2259   SODIUM mmol/L 128*   < > 127*   POTASSIUM mmol/L 4.7   < > 5.0   CHLORIDE mmol/L 98   < > 97   CO2 mmol/L 21   < > 21   BUN mg/dL 63*   < > 60*   CREATININE mg/dL 2.87*   < > 2.56*   ANION GAP mmol/L 9   < > 9   CALCIUM mg/dL 7.7*   < > 7.9*   ALBUMIN g/dL  --   --  3.7   TOTAL BILIRUBIN mg/dL  --   --  0.69   ALK PHOS U/L  --   --  71   ALT U/L  --   --  17   AST U/L  --   --  20   GLUCOSE RANDOM mg/dL 193*   < > 285*    < > = values in this interval not displayed.     Results from last 7 days   Lab Units 12/18/24  0228   INR  1.82*     Results from last 7 days   Lab Units 12/18/24  2029 12/18/24  1713 12/18/24  1200 12/18/24  0814   POC GLUCOSE mg/dl 155* 193* 227* 186*     Results from last 7 days   Lab Units 12/18/24  0541   HEMOGLOBIN A1C % 7.3*     Results from last 7 days   Lab Units 12/18/24  0541 12/18/24  0228   LACTIC ACID mmol/L  --  1.2   PROCALCITONIN ng/ml 0.23  --        Recent Cultures (last 7 days):   Results from last 7 days   Lab Units 12/18/24  1001   LEGIONELLA URINARY ANTIGEN  Negative       Imaging Results Review: No pertinent imaging studies reviewed.  Other Study Results Review: No additional pertinent studies reviewed.    Last 24 Hours Medication List:     Current Facility-Administered Medications:     acetaminophen (TYLENOL) tablet 975 mg, Q6H PRN    azithromycin (ZITHROMAX) tablet 500 mg, Q24H    ceftriaxone (ROCEPHIN) 1 g/50 mL in dextrose IVPB, Q24H, Last Rate: 1,000 mg (12/18/24 2304)    guaiFENesin (MUCINEX) 12 hr tablet 600 mg, Q12H MAXWELL    insulin glargine (LANTUS) subcutaneous injection 40 Units 0.4 mL, Q12H MAXWELL    insulin lispro (HumALOG/ADMELOG) 100 units/mL subcutaneous injection 2-12 Units, TID AC **AND** Fingerstick Glucose  (POCT), TID AC    insulin lispro (HumALOG/ADMELOG) 100 units/mL subcutaneous injection 2-12 Units, HS    levalbuterol (XOPENEX) inhalation solution 1.25 mg, TID    [Held by provider] losartan (COZAAR) tablet 25 mg, HS    metoprolol tartrate (LOPRESSOR) tablet 50 mg, Q12H MAXWELL    pravastatin (PRAVACHOL) tablet 40 mg, Daily With Dinner    [Held by provider] rivaroxaban (XARELTO) tablet 20 mg, Daily With Breakfast    trimethobenzamide (TIGAN) IM injection 200 mg, Q6H PRN    Administrative Statements   Today, Patient Was Seen By: Areli Wilcox, DO  I have spent a total time of 30 minutes in caring for this patient on the day of the visit/encounter including Impressions, Counseling / Coordination of care, Documenting in the medical record, Reviewing / ordering tests, medicine, procedures  , Obtaining or reviewing history  , and Communicating with other healthcare professionals .    **Please Note: This note may have been constructed using a voice recognition system.**

## 2024-12-19 NOTE — ASSESSMENT & PLAN NOTE
Lab Results   Component Value Date    EGFR 19 12/19/2024    EGFR 21 12/18/2024    EGFR 22 12/18/2024    CREATININE 3.18 (H) 12/19/2024    CREATININE 2.87 (H) 12/18/2024    CREATININE 2.75 (H) 12/18/2024     Elevated creatinine, baseline 2.1-2.3  Patient takes losartan, farxiga, and Kerendia   Patient endorses decrease in appetite for the past 2 days PTA    PLAN:  Hold losartan in the setting of elevated creatinine  Plasmalyte 100 mL/hr IVF  Continue to monitor BMP  Avoid nephrotoxic drugs

## 2024-12-19 NOTE — ASSESSMENT & PLAN NOTE
Lab Results   Component Value Date    HCT 53.8 (H) 12/18/2024    HGB 17.5 (H) 12/18/2024      Patient takes testosterone supplements at home  Erythrocytosis can be secondary to testosterone supplements versus obstructive sleep apnea

## 2024-12-20 VITALS
TEMPERATURE: 97.3 F | OXYGEN SATURATION: 100 % | HEIGHT: 69 IN | RESPIRATION RATE: 19 BRPM | WEIGHT: 259.48 LBS | SYSTOLIC BLOOD PRESSURE: 117 MMHG | BODY MASS INDEX: 38.43 KG/M2 | DIASTOLIC BLOOD PRESSURE: 80 MMHG | HEART RATE: 85 BPM

## 2024-12-20 LAB
ANION GAP SERPL CALCULATED.3IONS-SCNC: 9 MMOL/L (ref 4–13)
BUN SERPL-MCNC: 67 MG/DL (ref 5–25)
CALCIUM SERPL-MCNC: 8.2 MG/DL (ref 8.4–10.2)
CHLORIDE SERPL-SCNC: 103 MMOL/L (ref 96–108)
CO2 SERPL-SCNC: 25 MMOL/L (ref 21–32)
CREAT SERPL-MCNC: 2.44 MG/DL (ref 0.6–1.3)
GFR SERPL CREATININE-BSD FRML MDRD: 26 ML/MIN/1.73SQ M
GLUCOSE SERPL-MCNC: 138 MG/DL (ref 65–140)
GLUCOSE SERPL-MCNC: 81 MG/DL (ref 65–140)
GLUCOSE SERPL-MCNC: 95 MG/DL (ref 65–140)
POTASSIUM SERPL-SCNC: 4 MMOL/L (ref 3.5–5.3)
SODIUM SERPL-SCNC: 137 MMOL/L (ref 135–147)

## 2024-12-20 PROCEDURE — 80048 BASIC METABOLIC PNL TOTAL CA: CPT

## 2024-12-20 PROCEDURE — 99239 HOSP IP/OBS DSCHRG MGMT >30: CPT | Performed by: HOSPITALIST

## 2024-12-20 PROCEDURE — 82948 REAGENT STRIP/BLOOD GLUCOSE: CPT

## 2024-12-20 RX ADMIN — LEVALBUTEROL HYDROCHLORIDE 1.25 MG: 1.25 SOLUTION RESPIRATORY (INHALATION) at 07:14

## 2024-12-20 RX ADMIN — METOPROLOL TARTRATE 50 MG: 50 TABLET, FILM COATED ORAL at 10:27

## 2024-12-20 RX ADMIN — GUAIFENESIN 600 MG: 600 TABLET ORAL at 10:27

## 2024-12-20 RX ADMIN — SODIUM CHLORIDE, SODIUM GLUCONATE, SODIUM ACETATE, POTASSIUM CHLORIDE, MAGNESIUM CHLORIDE, SODIUM PHOSPHATE, DIBASIC, AND POTASSIUM PHOSPHATE 100 ML/HR: .53; .5; .37; .037; .03; .012; .00082 INJECTION, SOLUTION INTRAVENOUS at 06:38

## 2024-12-20 RX ADMIN — INSULIN GLARGINE 40 UNITS: 100 INJECTION, SOLUTION SUBCUTANEOUS at 10:27

## 2024-12-20 NOTE — HOSPITAL COURSE
Leandro Blanchard is a 64 y.o. male with a PMH of CKD, DM, HLD, Afib, kidney stones,  who presented with left flank pain, hematuria, and shortness of breath. He was previously treated for CAP as outpatient in the beginning of December with a course of doxycycline. He was seen at urgent care for a second time on December 17, given Augmentin which he took for 1 dose prior to coming to the ED. CT CAP was done which showed concerns for pneumonia and acute pyelonephritis.  Patient received 1 dose of ceftriaxone in the ED. He was admitted for further evaluation and management.    During patient's admission, he had an episode of hematuria associated with passing of a small kidney stone. Patient's left flank pain subsequently resolved. Antibiotics were discontinued as there were no further concerns for urinary infection and patient's pneumonia has been treated. His creatinine function was elevated likely due to ATN secondary to doxycyline use. On discharge, his creatinine improved. Patient is symptom free and feels well for discharge.

## 2024-12-20 NOTE — ASSESSMENT & PLAN NOTE
Continue metoprolol 50 Mg twice daily  Xarelto may be continued at home as patient no longer has hematuria

## 2024-12-20 NOTE — ASSESSMENT & PLAN NOTE
Left sided flank pain associated with hematuria. No smoking history  Urinalysis shows moderate occult blood, urine microscopic shows 10-20 RBCs  CT abdomen: Fullness of the left renal collecting system with perinephric stranding, concerns for acute pyelonephritis  PSA December 2023 within normal limits (1.0)  Patient also has history of kidney stones, no kidney stone seen on the CT scan    PLAN:  Patient passed kidney stone and has no further hematuria.  He does not require any further antibiotics.

## 2024-12-20 NOTE — ASSESSMENT & PLAN NOTE
Patient's sodium at 127  Can be secondary to decreased oral intake versus pneumonia    PLAN:  Sodium remained stable during hospital stay   Legionella negative

## 2024-12-20 NOTE — ASSESSMENT & PLAN NOTE
Lab Results   Component Value Date    HCT 53.7 (H) 12/19/2024    HGB 17.4 (H) 12/19/2024      Patient takes testosterone supplements at home  Erythrocytosis can be secondary to testosterone supplements versus obstructive sleep apnea

## 2024-12-20 NOTE — ASSESSMENT & PLAN NOTE
Patient takes simvastatin 40 Mg daily at home    PLAN;  Was taking pravastatin 40 Mg daily while in the hospital.  Can resume home simvastatin

## 2024-12-20 NOTE — ASSESSMENT & PLAN NOTE
Presented to the ED with shortness of breath, URI symptoms including congestion cough since first week of December  Patient initially seen by urgent care and was started on doxycycline, no improvement was seen again on December 17 and was started on Augmentin, took 1 dose  WBC improved, lactic acid normal, flu RSV COVID-negative  CT chest:  Mild tree-in-bud nodularity in the right lower lobe suggestive of an infectious or inflammatory process. Bilateral lower lobe peribronchial thickening.  Legionella, strep pneumo negative, C3 normal    PLAN;  Patient has completed antibiotics for pneumonia. No further treatment at this time.  May continue Mucinex at home if needed

## 2024-12-20 NOTE — DISCHARGE SUMMARY
Discharge Summary - Hospitalist   Name: Leandro Blanchard 64 y.o. male I MRN: [id number]  Unit/Bed#: S -01 I Date of Admission: 12/18/2024   Date of Service: 12/20/2024 I Hospital Day: 2     Assessment & Plan  Shortness of breath  Presented to the ED with shortness of breath, URI symptoms including congestion cough since first week of December  Patient initially seen by urgent care and was started on doxycycline, no improvement was seen again on December 17 and was started on Augmentin, took 1 dose  WBC improved, lactic acid normal, flu RSV COVID-negative  CT chest:  Mild tree-in-bud nodularity in the right lower lobe suggestive of an infectious or inflammatory process. Bilateral lower lobe peribronchial thickening.  Legionella, strep pneumo negative, C3 normal    PLAN;  Patient has completed antibiotics for pneumonia. No further treatment at this time.  May continue Mucinex at home if needed    Hematuria  Left sided flank pain associated with hematuria. No smoking history  Urinalysis shows moderate occult blood, urine microscopic shows 10-20 RBCs  CT abdomen: Fullness of the left renal collecting system with perinephric stranding, concerns for acute pyelonephritis  PSA December 2023 within normal limits (1.0)  Patient also has history of kidney stones, no kidney stone seen on the CT scan    PLAN:  Patient passed kidney stone and has no further hematuria.  He does not require any further antibiotics.  Stage 3b chronic kidney disease (HCC)  Lab Results   Component Value Date    EGFR 26 12/20/2024    EGFR 19 12/19/2024    EGFR 21 12/18/2024    CREATININE 2.44 (H) 12/20/2024    CREATININE 3.18 (H) 12/19/2024    CREATININE 2.87 (H) 12/18/2024     Elevated creatinine, baseline 2.1-2.3  Patient takes losartan, farxiga, and Kerendia     PLAN:  Patient received IV fluids and creatinine was monitored in the hospital.  His creatinine improved from 3.18-2.44 today which is his baseline.  He may resume home medications.    Type 2 diabetes mellitus (HCC)  Last A1c September, 2024 was 7.0    Blood Sugar Average: Last 72 hrs:  (P) 156.3058437517165187  Patient takes detemir 40 units twice daily, Farxiga    PLAN;  Continue home medications  A-fib (HCC)  Continue metoprolol 50 Mg twice daily  Xarelto may be continued at home as patient no longer has hematuria  HLD (hyperlipidemia)  Patient takes simvastatin 40 Mg daily at home    PLAN;  Was taking pravastatin 40 Mg daily while in the hospital.  Can resume home simvastatin  Hyponatremia  Patient's sodium at 127  Can be secondary to decreased oral intake versus pneumonia    PLAN:  Sodium remained stable during hospital stay   Legionella negative     Erythrocytosis  Lab Results   Component Value Date    HCT 53.7 (H) 12/19/2024    HGB 17.4 (H) 12/19/2024      Patient takes testosterone supplements at home  Erythrocytosis can be secondary to testosterone supplements versus obstructive sleep apnea     Medical Problems       Resolved Problems  Date Reviewed: 12/18/2024   None       Discharging Physician / Practitioner: Areli Wilcox DO  PCP: Aileen Henderson MD  Admission Date:   Admission Orders (From admission, onward)       Ordered        12/18/24 0445  INPATIENT ADMISSION  Once                          Discharge Date: 12/20/24    Consultations During Hospital Stay:  None    Procedures Performed:   None    Significant Findings / Test Results:  CT chest abdomen pelvis:  1.  Limit evaluation due to motion artifact in the chest and lack of intravenous contrast  2.  Mild tree-in-bud nodularity in the right lower lobe suggestive of an infectious or inflammatory process.  3.  Bilateral lower lobe peribronchial thickening.  4.  Fullness of the left renal collecting system with perinephric stranding. Although nonspecific, the findings can be seen with ascending urinary tract infection in the appropriate setting. Evaluation of the renal parenchyma for acute pyelonephritis is limited without  intravenous contrast.  5. 2.5 cm hypodense lesion in the interpolar left kidney likely a cyst but incompletely evaluated on this noncontrast study. Recommend correlation with nonemergent renal ultrasound.    CXR: No focal consolidation, pleural effusion, or pneumothorax.    Incidental Findings:   None    Test Results Pending at Discharge (will require follow up):   None     Outpatient Tests Requested:  None    Complications: None    Reason for Admission: Hematuria and pneumonia    Hospital Course:   Leandro Blanchard is a 64 y.o. male patient who originally presented to the hospital on 12/18/2024 due to URI symptoms since the beginning of December, associated with new onset hematuria and left-sided flank pain.  Patient was seen by urgent care twice in the month of December, was treated with a course of doxycycline, and subsequently given 1 dose of Augmentin to treat pneumonia.  Patient reports hematuria associated with left flank pain for a few days prior to arrival.  He had a CT chest abdomen pelvis which showed tree-in-bud nodularity with bilateral lower lobe peribronchial thickening, possible concern for pneumonia.  CT abdomen revealed fullness of the left renal system with perinephric stranding concerning for acute pyelonephritis.  Patient was treated with an additional dose of antibiotics due to possible pyelonephritis.  Overnight in the hospital he had an episode of hematuria associated with passing a small kidney stone.  Once his kidney stone passed, he had no further episodes of hematuria and his flank pain resolved.  Antibiotics were discontinued as patient had no urinary symptoms and no further concern for infection.  Patient was treated as outpatient for pneumonia as well.  Patient's vital signs remained stable during his stay.  He has no further complaints and feels well for discharge.    Please see above list of diagnoses and related plan for additional information.     Condition at Discharge:  "stable    Discharge Day Visit / Exam:   Subjective: No overnight events.  Patient feels well today he has no further hematuria or flank pain.  He denies any chest pain shortness of breath palpitations.  Vitals: Blood Pressure: 117/80 (12/20/24 0731)  Pulse: 85 (12/20/24 0731)  Temperature: (!) 97.3 °F (36.3 °C) (12/20/24 0731)  Temp Source: Oral (12/18/24 1735)  Respirations: 19 (12/20/24 0731)  Height: 5' 9\" (175.3 cm) (12/17/24 2250)  Weight - Scale: 118 kg (259 lb 7.7 oz) (12/17/24 2250)  SpO2: 100 % (12/20/24 0731)  Physical Exam  Vitals reviewed.   Constitutional:       Appearance: Normal appearance.   HENT:      Head: Normocephalic and atraumatic.      Mouth/Throat:      Mouth: Mucous membranes are moist.   Eyes:      General: No scleral icterus.     Extraocular Movements: Extraocular movements intact.      Conjunctiva/sclera: Conjunctivae normal.   Cardiovascular:      Rate and Rhythm: Normal rate and regular rhythm.      Pulses: Normal pulses.      Heart sounds: Normal heart sounds. No murmur heard.     No friction rub. No gallop.   Pulmonary:      Effort: Pulmonary effort is normal.      Breath sounds: Normal breath sounds. No wheezing, rhonchi or rales.   Abdominal:      General: Bowel sounds are normal. There is no distension.      Palpations: Abdomen is soft.      Tenderness: There is no abdominal tenderness. There is no right CVA tenderness or left CVA tenderness.   Musculoskeletal:         General: No swelling or tenderness. Normal range of motion.      Cervical back: Normal range of motion and neck supple.   Skin:     General: Skin is warm and dry.   Neurological:      Mental Status: He is alert and oriented to person, place, and time.   Psychiatric:         Mood and Affect: Mood normal.         Behavior: Behavior normal.         Discussion with Family: Updated  (wife) at bedside.    Discharge instructions/Information to patient and family:   See after visit summary for information " provided to patient and family.      Provisions for Follow-Up Care:  See after visit summary for information related to follow-up care and any pertinent home health orders.      Mobility at time of Discharge:   Basic Mobility Inpatient Raw Score: 24  JH-HLM Goal: 8: Walk 250 feet or more  JH-HLM Achieved: 8: Walk 250 feet ot more  HLM Goal achieved. Continue to encourage appropriate mobility.     Disposition:   Home    Planned Readmission: no    Discharge Medications:  See after visit summary for reconciled discharge medications provided to patient and/or family.      Administrative Statements   Discharge Statement:  I have spent a total time of 30 minutes in caring for this patient on the day of the visit/encounter. >30 minutes of time was spent on: Risks and benefits of tx options, Instructions for management, Impressions, Counseling / Coordination of care, Documenting in the medical record, Reviewing / ordering tests, medicine, procedures  , and Communicating with other healthcare professionals .    **Please Note: This note may have been constructed using a voice recognition system**

## 2024-12-20 NOTE — PLAN OF CARE
Problem: Potential for Falls  Goal: Patient will remain free of falls  Description: INTERVENTIONS:  - Educate patient/family on patient safety including physical limitations  - Instruct patient to call for assistance with activity   - Consult OT/PT to assist with strengthening/mobility   - Keep Call bell within reach  - Keep bed low and locked with side rails adjusted as appropriate  - Keep care items and personal belongings within reach  - Initiate and maintain comfort rounds  - Make Fall Risk Sign visible to staff  - Offer Toileting every 2 Hours, in advance of need  - Initiate/Maintain bed/chair alarm  - Obtain necessary fall risk management equipment  - Apply yellow socks and bracelet for high fall risk patients  - Consider moving patient to room near nurses station  Outcome: Progressing     Problem: RESPIRATORY - ADULT  Goal: Achieves optimal ventilation and oxygenation  Description: INTERVENTIONS:  - Assess for changes in respiratory status  - Assess for changes in mentation and behavior  - Position to facilitate oxygenation and minimize respiratory effort  - Oxygen administered by appropriate delivery if ordered  - Initiate smoking cessation education as indicated  - Encourage broncho-pulmonary hygiene including cough, deep breathe, Incentive Spirometry  - Assess the need for suctioning and aspirate as needed  - Assess and instruct to report SOB or any respiratory difficulty  - Respiratory Therapy support as indicated  Outcome: Progressing     Problem: GENITOURINARY - ADULT  Goal: Maintains or returns to baseline urinary function  Description: INTERVENTIONS:  - Assess urinary function  - Encourage oral fluids to ensure adequate hydration if ordered  - Administer IV fluids as ordered to ensure adequate hydration  - Administer ordered medications as needed  - Offer frequent toileting  - Follow urinary retention protocol if ordered  Outcome: Progressing     Problem: HEMATOLOGIC - ADULT  Goal: Maintains  hematologic stability  Description: INTERVENTIONS  - Assess for signs and symptoms of bleeding or hemorrhage  - Monitor labs  - Administer supportive blood products/factors as ordered and appropriate  Outcome: Progressing

## 2024-12-20 NOTE — ASSESSMENT & PLAN NOTE
Lab Results   Component Value Date    EGFR 26 12/20/2024    EGFR 19 12/19/2024    EGFR 21 12/18/2024    CREATININE 2.44 (H) 12/20/2024    CREATININE 3.18 (H) 12/19/2024    CREATININE 2.87 (H) 12/18/2024     Elevated creatinine, baseline 2.1-2.3  Patient takes losartan, farxiga, and Kerendia     PLAN:  Patient received IV fluids and creatinine was monitored in the hospital.  His creatinine improved from 3.18-2.44 today which is his baseline.  He may resume home medications.

## 2024-12-20 NOTE — ASSESSMENT & PLAN NOTE
Last A1c September, 2024 was 7.0    Blood Sugar Average: Last 72 hrs:  (P) [id number]  Patient takes detemir 40 units twice daily, Farxiga    PLAN;  Continue home medications

## 2024-12-20 NOTE — DISCHARGE INSTR - AVS FIRST PAGE
Dear Leandro Blanchard,     It was our pleasure to care for you here at [facility].  It is our hope that we were always able to exceed the expected standards for your care during your stay.  You were hospitalized due to kidney stone and pneumonia.  You were cared for on the S 4th floor by Areli Wilcox DO under the service of Kisha King MD with the [facility] who covers for your primary care physician (PCP), Aileen Henderson MD, while you were hospitalized.  If you have any questions or concerns related to this hospitalization, you may contact us at .  For follow up as well as any medication refills, we recommend that you follow up with your primary care physician.  A registered nurse will reach out to you by phone within a few days after your discharge to answer any additional questions that you may have after going home.  However, at this time we provide for you here, the most important instructions / recommendations at discharge:     Notable Medication Adjustments -   None. Please continue taking your home medications as prescribed.  Testing Required after Discharge -   None  ** Please contact your PCP to request testing orders for any of the testing recommended here **  Important follow up information -   Please follow up with your PCP within 1 week.  Other Instructions -   Please return to the ED or call 911 with any new or worsening symptoms.  Please review this entire after visit summary as additional general instructions including medication list, appointments, activity, diet, any pertinent wound care, and other additional recommendations from your care team that may be provided for you.      Sincerely,     Areli Wilcox DO

## 2025-01-06 ENCOUNTER — RX CHECK (OUTPATIENT)
Dept: URBAN - METROPOLITAN AREA CLINIC 6 | Facility: CLINIC | Age: 65
End: 2025-01-06

## 2025-01-06 DIAGNOSIS — Z96.1: ICD-10-CM

## 2025-01-06 PROCEDURE — 92015 DETERMINE REFRACTIVE STATE: CPT

## 2025-01-06 PROCEDURE — 99024 POSTOP FOLLOW-UP VISIT: CPT

## 2025-01-06 ASSESSMENT — VISUAL ACUITY
OS_SC: 20/25
OD_SC: 20/30

## 2025-01-06 ASSESSMENT — TONOMETRY
OS_IOP_MMHG: 16
OD_IOP_MMHG: 18

## 2025-05-08 ENCOUNTER — ESTABLISHED COMPREHENSIVE EXAM (OUTPATIENT)
Dept: URBAN - METROPOLITAN AREA CLINIC 6 | Facility: CLINIC | Age: 65
End: 2025-05-08

## 2025-05-08 DIAGNOSIS — H26.493: ICD-10-CM

## 2025-05-08 DIAGNOSIS — H04.123: ICD-10-CM

## 2025-05-08 DIAGNOSIS — Z79.4: ICD-10-CM

## 2025-05-08 DIAGNOSIS — H01.024: ICD-10-CM

## 2025-05-08 DIAGNOSIS — H43.813: ICD-10-CM

## 2025-05-08 DIAGNOSIS — H02.834: ICD-10-CM

## 2025-05-08 DIAGNOSIS — E11.9: ICD-10-CM

## 2025-05-08 DIAGNOSIS — H01.021: ICD-10-CM

## 2025-05-08 DIAGNOSIS — H02.831: ICD-10-CM

## 2025-05-08 DIAGNOSIS — H35.371: ICD-10-CM

## 2025-05-08 PROCEDURE — 92014 COMPRE OPH EXAM EST PT 1/>: CPT

## 2025-05-08 ASSESSMENT — VISUAL ACUITY
OS_CC: 20/20-2
OD_CC: 20/30-2

## 2025-05-08 ASSESSMENT — TONOMETRY
OS_IOP_MMHG: 12
OD_IOP_MMHG: 13

## (undated) RX ORDER — LOTILANER OPHTHALMIC SOLUTION 2.5 MG/ML: 1 SOLUTION/ DROPS OPHTHALMIC TWICE A DAY